# Patient Record
Sex: FEMALE | Race: BLACK OR AFRICAN AMERICAN | Employment: UNEMPLOYED | URBAN - METROPOLITAN AREA
[De-identification: names, ages, dates, MRNs, and addresses within clinical notes are randomized per-mention and may not be internally consistent; named-entity substitution may affect disease eponyms.]

---

## 2017-02-14 ENCOUNTER — OFFICE VISIT (OUTPATIENT)
Dept: LAB | Facility: REFERENCE LAB | Age: 47
End: 2017-02-14
Attending: OBSTETRICS & GYNECOLOGY
Payer: COMMERCIAL

## 2017-02-14 ENCOUNTER — OFFICE VISIT (OUTPATIENT)
Dept: OBGYN CLINIC | Facility: CLINIC | Age: 47
End: 2017-02-14

## 2017-02-14 VITALS — SYSTOLIC BLOOD PRESSURE: 122 MMHG | DIASTOLIC BLOOD PRESSURE: 68 MMHG

## 2017-02-14 DIAGNOSIS — N93.9 ABNORMAL UTERINE BLEEDING: ICD-10-CM

## 2017-02-14 DIAGNOSIS — D25.1 FIBROIDS, INTRAMURAL: ICD-10-CM

## 2017-02-14 DIAGNOSIS — D25.1 FIBROIDS, INTRAMURAL: Primary | ICD-10-CM

## 2017-02-14 PROBLEM — Z98.890 HISTORY OF ABDOMINOPLASTY: Status: ACTIVE | Noted: 2017-02-14

## 2017-02-14 LAB
BASOPHILS # BLD: 0 K/UL (ref 0–0.2)
BASOPHILS NFR BLD: 1 %
EOSINOPHIL # BLD: 0.1 K/UL (ref 0–0.7)
EOSINOPHIL NFR BLD: 2 %
ERYTHROCYTE [DISTWIDTH] IN BLOOD BY AUTOMATED COUNT: 16.4 % (ref 11–15)
HCT VFR BLD AUTO: 40.1 % (ref 35–48)
HGB BLD-MCNC: 12.9 G/DL (ref 12–16)
LYMPHOCYTES # BLD: 1.7 K/UL (ref 1–4)
LYMPHOCYTES NFR BLD: 36 %
MCH RBC QN AUTO: 27.2 PG (ref 27–32)
MCHC RBC AUTO-ENTMCNC: 32.1 G/DL (ref 32–37)
MCV RBC AUTO: 84.7 FL (ref 80–100)
MONOCYTES # BLD: 0.3 K/UL (ref 0–1)
MONOCYTES NFR BLD: 7 %
NEUTROPHILS # BLD AUTO: 2.6 K/UL (ref 1.8–7.7)
NEUTROPHILS NFR BLD: 55 %
PLATELET # BLD AUTO: 224 K/UL (ref 140–400)
PMV BLD AUTO: 11.3 FL (ref 7.4–10.3)
RBC # BLD AUTO: 4.74 M/UL (ref 3.7–5.4)
WBC # BLD AUTO: 4.7 K/UL (ref 4–11)

## 2017-02-14 PROCEDURE — 36415 COLL VENOUS BLD VENIPUNCTURE: CPT

## 2017-02-14 PROCEDURE — 99213 OFFICE O/P EST LOW 20 MIN: CPT | Performed by: OBSTETRICS & GYNECOLOGY

## 2017-02-14 PROCEDURE — 85025 COMPLETE CBC W/AUTO DIFF WBC: CPT

## 2017-02-14 RX ORDER — ERGOCALCIFEROL 1.25 MG/1
CAPSULE ORAL
Refills: 11 | COMMUNITY
Start: 2017-02-06 | End: 2020-02-17

## 2017-03-22 ENCOUNTER — TELEPHONE (OUTPATIENT)
Dept: OBGYN CLINIC | Facility: CLINIC | Age: 47
End: 2017-03-22

## 2017-03-22 DIAGNOSIS — N60.11 FIBROCYSTIC BREAST CHANGES OF BOTH BREASTS: Primary | ICD-10-CM

## 2017-03-22 DIAGNOSIS — N60.12 FIBROCYSTIC BREAST CHANGES OF BOTH BREASTS: Primary | ICD-10-CM

## 2017-03-22 NOTE — TELEPHONE ENCOUNTER
SPOKE WITH PATIENT REGARDING DIAG BILATERAL MAMMO W/ POSS USN DX FIBROCYSTIC BREASTS NEW ORDER PUT IN PATIENT ALSO STATES THAT SHE NEED OLD MAMMO FROM [place] TOLD HER I WILL FAX OVER HER IF SHE GET ME FAX NUMBER   Fax # [phone]

## 2017-03-28 ENCOUNTER — HOSPITAL ENCOUNTER (OUTPATIENT)
Dept: MAMMOGRAPHY | Facility: HOSPITAL | Age: 47
Discharge: HOME | End: 2017-03-28
Attending: OBSTETRICS & GYNECOLOGY
Payer: COMMERCIAL

## 2017-03-28 ENCOUNTER — HOSPITAL ENCOUNTER (OUTPATIENT)
Dept: ULTRASOUND IMAGING | Facility: HOSPITAL | Age: 47
Discharge: HOME | End: 2017-03-28
Attending: OBSTETRICS & GYNECOLOGY
Payer: COMMERCIAL

## 2017-03-28 DIAGNOSIS — N60.11 FIBROCYSTIC BREAST CHANGES OF BOTH BREASTS: ICD-10-CM

## 2017-03-28 DIAGNOSIS — N60.12 FIBROCYSTIC BREAST CHANGES OF BOTH BREASTS: ICD-10-CM

## 2017-03-28 PROCEDURE — 77066 DX MAMMO INCL CAD BI: CPT

## 2017-03-28 PROCEDURE — 76642 ULTRASOUND BREAST LIMITED: CPT

## 2017-04-03 ENCOUNTER — OFFICE VISIT (OUTPATIENT)
Dept: INTERNAL MEDICINE CLINIC | Facility: CLINIC | Age: 47
End: 2017-04-03

## 2017-04-03 VITALS
BODY MASS INDEX: 29.08 KG/M2 | HEART RATE: 72 BPM | HEIGHT: 62 IN | TEMPERATURE: 99 F | SYSTOLIC BLOOD PRESSURE: 100 MMHG | DIASTOLIC BLOOD PRESSURE: 60 MMHG | WEIGHT: 158 LBS

## 2017-04-03 DIAGNOSIS — L20.84 INTRINSIC ECZEMA: ICD-10-CM

## 2017-04-03 DIAGNOSIS — Z91.09 ENVIRONMENTAL ALLERGIES: ICD-10-CM

## 2017-04-03 DIAGNOSIS — S39.012A LOW BACK STRAIN, INITIAL ENCOUNTER: Primary | ICD-10-CM

## 2017-04-03 PROCEDURE — 99214 OFFICE O/P EST MOD 30 MIN: CPT | Performed by: INTERNAL MEDICINE

## 2017-04-03 PROCEDURE — 99212 OFFICE O/P EST SF 10 MIN: CPT | Performed by: INTERNAL MEDICINE

## 2017-04-03 RX ORDER — MELOXICAM 7.5 MG/1
7.5 TABLET ORAL DAILY
Qty: 30 TABLET | Refills: 5 | Status: SHIPPED | OUTPATIENT
Start: 2017-04-03 | End: 2018-05-16

## 2017-04-03 RX ORDER — TRIAMCINOLONE ACETONIDE 0.25 MG/G
CREAM TOPICAL
Qty: 15 G | Refills: 3 | Status: SHIPPED | OUTPATIENT
Start: 2017-04-03 | End: 2018-09-12

## 2017-04-03 NOTE — PROGRESS NOTES
HPI:    Patient ID: Mehdi Mosquera is a 40year old female. Back Pain  This is a chronic problem. The current episode started more than 1 month ago. The problem occurs constantly. The pain is present in the lumbar spine.  The pain radiates to the rig Genitourinary: Negative for bladder incontinence, dysuria, urgency, frequency, hematuria, flank pain, difficulty urinating, genital sores and pelvic pain. Musculoskeletal: Positive for myalgias and back pain. Negative for neck pain and neck stiffness. Lumbar back: She exhibits decreased range of motion, pain and spasm. Back:    Neurological: She is alert and oriented to person, place, and time. She exhibits normal muscle tone. Coordination normal.   Skin: Skin is warm. No rash noted.  No eryt

## 2017-08-29 ENCOUNTER — TELEPHONE (OUTPATIENT)
Dept: INTERNAL MEDICINE CLINIC | Facility: CLINIC | Age: 47
End: 2017-08-29

## 2017-08-29 DIAGNOSIS — L81.9 HYPERPIGMENTATION: Primary | ICD-10-CM

## 2017-08-30 ENCOUNTER — OFFICE VISIT (OUTPATIENT)
Dept: OBGYN CLINIC | Facility: CLINIC | Age: 47
End: 2017-08-30

## 2017-08-30 VITALS
HEIGHT: 63 IN | BODY MASS INDEX: 28.53 KG/M2 | WEIGHT: 161 LBS | SYSTOLIC BLOOD PRESSURE: 118 MMHG | DIASTOLIC BLOOD PRESSURE: 74 MMHG

## 2017-08-30 DIAGNOSIS — Z01.419 WOMEN'S ANNUAL ROUTINE GYNECOLOGICAL EXAMINATION: Primary | ICD-10-CM

## 2017-08-30 PROBLEM — Z98.890 STATUS POST EMBOLIZATION OF UTERINE ARTERY: Status: ACTIVE | Noted: 2017-02-14

## 2017-08-30 PROBLEM — E55.9 VITAMIN D DEFICIENCY: Status: ACTIVE | Noted: 2017-08-30

## 2017-08-30 PROCEDURE — 88175 CYTOPATH C/V AUTO FLUID REDO: CPT | Performed by: OBSTETRICS & GYNECOLOGY

## 2017-08-30 PROCEDURE — 99396 PREV VISIT EST AGE 40-64: CPT | Performed by: OBSTETRICS & GYNECOLOGY

## 2017-08-30 PROCEDURE — 87624 HPV HI-RISK TYP POOLED RSLT: CPT | Performed by: OBSTETRICS & GYNECOLOGY

## 2017-08-30 NOTE — PROGRESS NOTES
Елеан is here for gynecologic exam.  Patient has long-standing history of uterine fibroids. She has had uterine artery embolization previously. Her periods are 26-28 days apart patient says that at times to heavy but not each and every.   He is heavy at approx  No date: REDUCTION OF LARGE BREAST  No date: REDUCTION RIGHT    Allergies     No Known Allergies    Medications       Current Outpatient Prescriptions:  triamcinolone acetonide 0.025 % External Cream Apply bid Disp: 15 g Rfl: 3   Meloxicam 7.5 MG O tender, no masses  GYNE/: External Genitalia: Normal appearing, no lesions. Urethral meatus appear wnl, no abnormal discharge or lesions noted.            Bladder: well supported, urethra wnl, no lesions or fissures                     Vagina: normal pink

## 2017-09-01 LAB — HPV I/H RISK 1 DNA SPEC QL NAA+PROBE: NEGATIVE

## 2017-11-10 ENCOUNTER — HOSPITAL ENCOUNTER (EMERGENCY)
Facility: HOSPITAL | Age: 47
Discharge: HOME | End: 2017-11-11
Attending: EMERGENCY MEDICINE
Payer: COMMERCIAL

## 2017-11-10 VITALS
RESPIRATION RATE: 18 BRPM | HEIGHT: 62 IN | BODY MASS INDEX: 29.44 KG/M2 | SYSTOLIC BLOOD PRESSURE: 121 MMHG | HEART RATE: 77 BPM | WEIGHT: 160 LBS | OXYGEN SATURATION: 98 % | DIASTOLIC BLOOD PRESSURE: 57 MMHG | TEMPERATURE: 99 F

## 2017-11-10 DIAGNOSIS — R10.9 ABDOMINAL WALL PAIN: ICD-10-CM

## 2017-11-10 DIAGNOSIS — R07.89 ATYPICAL CHEST PAIN: Primary | ICD-10-CM

## 2017-11-10 PROCEDURE — 93005 ELECTROCARDIOGRAM TRACING: CPT

## 2017-11-10 PROCEDURE — 93010 ELECTROCARDIOGRAM REPORT: CPT | Performed by: EMERGENCY MEDICINE

## 2017-11-10 PROCEDURE — 99285 EMERGENCY DEPT VISIT HI MDM: CPT

## 2017-11-10 PROCEDURE — 36415 COLL VENOUS BLD VENIPUNCTURE: CPT

## 2017-11-11 ENCOUNTER — APPOINTMENT (OUTPATIENT)
Dept: GENERAL RADIOLOGY | Facility: HOSPITAL | Age: 47
End: 2017-11-11
Attending: EMERGENCY MEDICINE
Payer: COMMERCIAL

## 2017-11-11 PROCEDURE — 80053 COMPREHEN METABOLIC PANEL: CPT | Performed by: EMERGENCY MEDICINE

## 2017-11-11 PROCEDURE — 83690 ASSAY OF LIPASE: CPT | Performed by: EMERGENCY MEDICINE

## 2017-11-11 PROCEDURE — 84484 ASSAY OF TROPONIN QUANT: CPT | Performed by: EMERGENCY MEDICINE

## 2017-11-11 PROCEDURE — 81001 URINALYSIS AUTO W/SCOPE: CPT | Performed by: EMERGENCY MEDICINE

## 2017-11-11 PROCEDURE — 81025 URINE PREGNANCY TEST: CPT

## 2017-11-11 PROCEDURE — 71010 XR CHEST AP PORTABLE  (CPT=71010): CPT | Performed by: EMERGENCY MEDICINE

## 2017-11-11 PROCEDURE — 85025 COMPLETE CBC W/AUTO DIFF WBC: CPT | Performed by: EMERGENCY MEDICINE

## 2017-11-11 RX ORDER — DIAZEPAM 5 MG/1
5 TABLET ORAL EVERY 8 HOURS PRN
Qty: 15 TABLET | Refills: 0 | Status: SHIPPED | OUTPATIENT
Start: 2017-11-11 | End: 2017-11-16

## 2017-11-11 NOTE — ED PROVIDER NOTES
Patient Seen in: [facility] AND [facility] Emergency Department    History   Patient presents with:  Abdominal Pain    Stated Complaint: abd pain     HPI    39 yo F without PMH/PSH aside from fibroids presenting with multiple complaints - noting several months of acid 400 MCG Oral Tab,  Take 400 mcg by mouth daily. B Complex-C (SUPER B COMPLEX OR),  Take by mouth.        Family History   Problem Relation Age of Onset   • Diabetes Mother    • Cancer Father 70     lung   • Diabetes Maternal Grandmother        Tiana PLATELET.   Procedure                               Abnormality         Status                     ---------                               -----------         ------                     CBC W/ DIFFERENTIAL[[id number]]                              In process pain, electrolyte derangement, VTE, anemia, MSK pain, gastritis, pancreatitis, biliary colic.     Pulse ox: 98%:Normal on RA, as interpreted by myself    Evaluation for multiple complaints - atypical chest pain without red flags in low risk HEART, PERC nega

## 2017-11-11 NOTE — ED INITIAL ASSESSMENT (HPI)
abd pain x 2 months, also c/o intermittent CP x 1 month. CP increased tonight when she was out tonight.

## 2017-12-09 ENCOUNTER — OFFICE VISIT (OUTPATIENT)
Dept: DERMATOLOGY CLINIC | Facility: CLINIC | Age: 47
End: 2017-12-09

## 2017-12-09 DIAGNOSIS — L30.8 OTHER ECZEMA: Primary | ICD-10-CM

## 2017-12-09 DIAGNOSIS — L81.0 HYPERPIGMENTATION OF SKIN, POSTINFLAMMATORY: ICD-10-CM

## 2017-12-09 PROBLEM — L30.9 ECZEMA: Status: ACTIVE | Noted: 2017-12-09

## 2017-12-09 PROCEDURE — 99202 OFFICE O/P NEW SF 15 MIN: CPT | Performed by: DERMATOLOGY

## 2017-12-09 PROCEDURE — 99212 OFFICE O/P EST SF 10 MIN: CPT | Performed by: DERMATOLOGY

## 2017-12-09 RX ORDER — TACROLIMUS 1 MG/G
1 OINTMENT TOPICAL 2 TIMES DAILY
Qty: 30 G | Refills: 3 | Status: SHIPPED | OUTPATIENT
Start: 2017-12-09 | End: 2018-09-12

## 2017-12-09 NOTE — PROGRESS NOTES
HPI:     Chief Complaint     Derm Problem        HPI     Derm Problem    Additional comments: New pt. Pt presents with referral for hyperpigmentation. Pt states that currently she is not treating with any OTC or Rx medications.  Pt denies pain or pruritus Oral Tab Take 400 mcg by mouth daily.  Disp:  Rfl:      Allergies:   No Known Allergies    Past Medical History:   Diagnosis Date   • Anemia    • Bacterial vaginal infection    • Decorative tattoo    • Fibroids    • Gestational diabetes    • Human papilloma low-grade inflammation possibly from eyelid dermatitis or eczema or allergies. I have explained to patient that we cannot treat with topical steroids or bleaching creams due to location.   Would therefore like to treat the probable still low-grade dermatit

## 2017-12-09 NOTE — PROGRESS NOTES
Past Medical History:   Diagnosis Date   • Anemia    • Bacterial vaginal infection    • Decorative tattoo    • Fibroids    • Gestational diabetes    • Human papilloma virus infection    • Pap smear for cervical cancer screening 11-    negative pap,

## 2017-12-11 ENCOUNTER — TELEPHONE (OUTPATIENT)
Dept: DERMATOLOGY CLINIC | Facility: CLINIC | Age: 47
End: 2017-12-11

## 2017-12-14 NOTE — TELEPHONE ENCOUNTER
[facility] pharmacy contacted informed PA was approved medication went through insurance per pharmacist pharmacy will contact patient.

## 2017-12-18 ENCOUNTER — OFFICE VISIT (OUTPATIENT)
Dept: INTERNAL MEDICINE CLINIC | Facility: CLINIC | Age: 47
End: 2017-12-18

## 2017-12-18 VITALS
HEIGHT: 63 IN | DIASTOLIC BLOOD PRESSURE: 60 MMHG | SYSTOLIC BLOOD PRESSURE: 96 MMHG | HEART RATE: 80 BPM | BODY MASS INDEX: 29.06 KG/M2 | TEMPERATURE: 99 F | WEIGHT: 164 LBS

## 2017-12-18 DIAGNOSIS — R10.9 ABDOMINAL PAIN IN FEMALE PATIENT: ICD-10-CM

## 2017-12-18 DIAGNOSIS — G56.03 CARPAL TUNNEL SYNDROME ON BOTH SIDES: Primary | ICD-10-CM

## 2017-12-18 PROCEDURE — 99214 OFFICE O/P EST MOD 30 MIN: CPT | Performed by: INTERNAL MEDICINE

## 2017-12-18 PROCEDURE — 99212 OFFICE O/P EST SF 10 MIN: CPT | Performed by: INTERNAL MEDICINE

## 2017-12-18 RX ORDER — MULTIVIT-MIN/IRON FUM/FOLIC AC 7.5 MG-4
1 TABLET ORAL DAILY
COMMUNITY
End: 2020-02-17

## 2017-12-18 NOTE — PROGRESS NOTES
HPI:    Patient ID: Charla Quinn is a 56year old female. Pain   This is a chronic problem. The current episode started more than 1 month ago. The problem occurs constantly. The problem has been unchanged.  Associated symptoms include abdominal mikey Allergic/Immunologic: Negative for immunocompromised state. Neurological: Negative for dizziness, syncope, facial asymmetry, weakness, light-headedness, numbness and headaches.             Current Outpatient Prescriptions:  Multiple Vitamins-Minerals (MUL Bilateral carpal tunnel syndrome  Physical therapy  eval treat for carpal tunnel syndrome. Abdominal pain in female patient  Abdominal mass to the left mid abdomen  CT Abdomen. No orders of the defined types were placed in this encounter.       Me

## 2018-01-20 ENCOUNTER — HOSPITAL ENCOUNTER (OUTPATIENT)
Dept: CT IMAGING | Facility: HOSPITAL | Age: 48
Discharge: HOME | End: 2018-01-20
Attending: INTERNAL MEDICINE
Payer: COMMERCIAL

## 2018-01-20 DIAGNOSIS — R10.9 ABDOMINAL PAIN IN FEMALE PATIENT: ICD-10-CM

## 2018-01-20 LAB — CREAT BLD-MCNC: 0.8 MG/DL (ref 0.5–1.5)

## 2018-01-20 PROCEDURE — 74160 CT ABDOMEN W/CONTRAST: CPT | Performed by: INTERNAL MEDICINE

## 2018-01-20 PROCEDURE — 82565 ASSAY OF CREATININE: CPT

## 2018-01-22 ENCOUNTER — TELEPHONE (OUTPATIENT)
Dept: INTERNAL MEDICINE CLINIC | Facility: CLINIC | Age: 48
End: 2018-01-22

## 2018-01-22 DIAGNOSIS — K42.9 UMBILICAL HERNIA WITHOUT OBSTRUCTION AND WITHOUT GANGRENE: Primary | ICD-10-CM

## 2018-01-25 ENCOUNTER — TELEPHONE (OUTPATIENT)
Dept: INTERNAL MEDICINE CLINIC | Facility: CLINIC | Age: 48
End: 2018-01-25

## 2018-01-25 NOTE — TELEPHONE ENCOUNTER
Left message for patient to call the office and make appointment, bring results with her, informed she will be having fasting labs and urine tests

## 2018-01-25 NOTE — TELEPHONE ENCOUNTER
Patient had a DOT test done, urine showed positive for protein. Patient would like to speak with you.

## 2018-01-25 NOTE — TELEPHONE ENCOUNTER
Patient will need to see me, bring results, she will need another urine test here and fasting blood tests. cmp hgbaic. UA and Urine for microalb.

## 2018-02-05 ENCOUNTER — OFFICE VISIT (OUTPATIENT)
Dept: INTERNAL MEDICINE CLINIC | Facility: CLINIC | Age: 48
End: 2018-02-05

## 2018-02-05 VITALS
TEMPERATURE: 98 F | DIASTOLIC BLOOD PRESSURE: 70 MMHG | HEIGHT: 63 IN | BODY MASS INDEX: 30.12 KG/M2 | SYSTOLIC BLOOD PRESSURE: 90 MMHG | HEART RATE: 72 BPM | WEIGHT: 170 LBS

## 2018-02-05 DIAGNOSIS — K59.01 SLOW TRANSIT CONSTIPATION: Primary | ICD-10-CM

## 2018-02-05 DIAGNOSIS — R80.1 PERSISTENT PROTEINURIA: ICD-10-CM

## 2018-02-05 LAB
ALBUMIN SERPL BCP-MCNC: 4 G/DL (ref 3.5–4.8)
ALBUMIN/GLOB SERPL: 1.2 {RATIO} (ref 1–2)
ALP SERPL-CCNC: 43 U/L (ref 32–100)
ALT SERPL-CCNC: 20 U/L (ref 14–54)
ANION GAP SERPL CALC-SCNC: 8 MMOL/L (ref 0–18)
AST SERPL-CCNC: 23 U/L (ref 15–41)
BILIRUB SERPL-MCNC: 0.3 MG/DL (ref 0.3–1.2)
BUN SERPL-MCNC: 7 MG/DL (ref 8–20)
BUN/CREAT SERPL: 10.8 (ref 10–20)
CALCIUM SERPL-MCNC: 9.4 MG/DL (ref 8.5–10.5)
CHLORIDE SERPL-SCNC: 103 MMOL/L (ref 95–110)
CO2 SERPL-SCNC: 23 MMOL/L (ref 22–32)
CREAT SERPL-MCNC: 0.65 MG/DL (ref 0.5–1.5)
CREAT UR-MCNC: 133.9 MG/DL
GLOBULIN PLAS-MCNC: 3.3 G/DL (ref 2.5–3.7)
GLUCOSE SERPL-MCNC: 92 MG/DL (ref 70–99)
MICROALBUMIN UR-MCNC: 1.1 MG/DL (ref 0–1.8)
MICROALBUMIN/CREAT UR: 8.2 MG/G{CREAT} (ref 0–20)
OSMOLALITY UR CALC.SUM OF ELEC: 276 MOSM/KG (ref 275–295)
POTASSIUM SERPL-SCNC: 4 MMOL/L (ref 3.3–5.1)
PROT SERPL-MCNC: 7.3 G/DL (ref 5.9–8.4)
SODIUM SERPL-SCNC: 134 MMOL/L (ref 136–144)

## 2018-02-05 PROCEDURE — 99214 OFFICE O/P EST MOD 30 MIN: CPT | Performed by: INTERNAL MEDICINE

## 2018-02-05 PROCEDURE — 99212 OFFICE O/P EST SF 10 MIN: CPT | Performed by: INTERNAL MEDICINE

## 2018-02-05 NOTE — PROGRESS NOTES
HPI:    Patient ID: Anthony Dupont is a 56year old female. Constipation   This is a chronic problem. The current episode started more than 1 year ago. The problem is unchanged. Her stool frequency is 1 time per week or less.  The stool is described stiffness. Allergic/Immunologic: Negative for immunocompromised state. Neurological: Negative for dizziness, syncope, facial asymmetry, weakness, light-headedness, numbness and headaches.             Current Outpatient Prescriptions:  Multiple Vitamins- recheck . Urine, might  Have had her period at the time of the work related urine studies. Slow transit constipation  Colace 100 mg twice a day ,   Citrucel one tab bid  And miralax daily .    If no better see GI          Orders Placed This Encounter

## 2018-02-06 LAB — HBA1C MFR BLD: 5.8 % (ref 4–6)

## 2018-04-23 ENCOUNTER — NURSE ONLY (OUTPATIENT)
Dept: FAMILY MEDICINE CLINIC | Facility: CLINIC | Age: 48
End: 2018-04-23

## 2018-04-23 PROCEDURE — 86580 TB INTRADERMAL TEST: CPT | Performed by: FAMILY MEDICINE

## 2018-04-25 ENCOUNTER — NURSE ONLY (OUTPATIENT)
Dept: FAMILY MEDICINE CLINIC | Facility: CLINIC | Age: 48
End: 2018-04-25

## 2018-04-25 DIAGNOSIS — Z11.1 ENCOUNTER FOR PPD SKIN TEST READING: Primary | ICD-10-CM

## 2018-04-25 NOTE — H&P
NAME AND  VERIFIED. PATIENT HERE FOR PPD READING. RESULT NEGATIVE. RESULT ENTERED AND LETTER GENERATED WITH RESULT.

## 2018-05-25 ENCOUNTER — TELEPHONE (OUTPATIENT)
Dept: FAMILY MEDICINE CLINIC | Facility: CLINIC | Age: 48
End: 2018-05-25

## 2018-05-25 DIAGNOSIS — L72.9 SCALP CYST: Primary | ICD-10-CM

## 2018-05-25 NOTE — TELEPHONE ENCOUNTER
Has a cyst on her head, had seen Dr Thee Zuniga, but not satisfied, would like a referral to a different dermatologist.    Will call her with information

## 2018-07-03 ENCOUNTER — LAB REQUISITION (OUTPATIENT)
Dept: LAB | Facility: HOSPITAL | Age: 48
End: 2018-07-03
Payer: COMMERCIAL

## 2018-07-03 DIAGNOSIS — R20.9 DISTURBANCE OF SKIN SENSATION: ICD-10-CM

## 2018-07-03 DIAGNOSIS — L72.0 EPIDERMAL CYST: ICD-10-CM

## 2018-07-03 PROCEDURE — 88304 TISSUE EXAM BY PATHOLOGIST: CPT | Performed by: DERMATOLOGY

## 2018-07-03 PROCEDURE — 88305 TISSUE EXAM BY PATHOLOGIST: CPT | Performed by: DERMATOLOGY

## 2018-09-12 ENCOUNTER — OFFICE VISIT (OUTPATIENT)
Dept: OBGYN CLINIC | Facility: CLINIC | Age: 48
End: 2018-09-12

## 2018-09-12 VITALS
HEIGHT: 63 IN | BODY MASS INDEX: 29.23 KG/M2 | SYSTOLIC BLOOD PRESSURE: 116 MMHG | WEIGHT: 165 LBS | DIASTOLIC BLOOD PRESSURE: 72 MMHG

## 2018-09-12 DIAGNOSIS — N93.9 ABNORMAL UTERINE BLEEDING: ICD-10-CM

## 2018-09-12 DIAGNOSIS — Z01.419 WOMEN'S ANNUAL ROUTINE GYNECOLOGICAL EXAMINATION: Primary | ICD-10-CM

## 2018-09-12 DIAGNOSIS — D25.1 FIBROIDS, INTRAMURAL: ICD-10-CM

## 2018-09-12 PROBLEM — Z98.890 H/O REDUCTION MAMMOPLASTY: Status: ACTIVE | Noted: 2018-09-12

## 2018-09-12 PROCEDURE — 88175 CYTOPATH C/V AUTO FLUID REDO: CPT | Performed by: OBSTETRICS & GYNECOLOGY

## 2018-09-12 PROCEDURE — 87491 CHLMYD TRACH DNA AMP PROBE: CPT | Performed by: OBSTETRICS & GYNECOLOGY

## 2018-09-12 PROCEDURE — 99396 PREV VISIT EST AGE 40-64: CPT | Performed by: OBSTETRICS & GYNECOLOGY

## 2018-09-12 PROCEDURE — 87591 N.GONORRHOEAE DNA AMP PROB: CPT | Performed by: OBSTETRICS & GYNECOLOGY

## 2018-09-12 PROCEDURE — 87624 HPV HI-RISK TYP POOLED RSLT: CPT | Performed by: OBSTETRICS & GYNECOLOGY

## 2018-09-13 LAB
C TRACH DNA SPEC QL NAA+PROBE: NEGATIVE
HPV I/H RISK 1 DNA SPEC QL NAA+PROBE: NEGATIVE
N GONORRHOEA DNA SPEC QL NAA+PROBE: NEGATIVE

## 2018-09-13 NOTE — PROGRESS NOTES
Елена for a checkup. Patient has history of uterine fibroids and has had abnormal uterine bleeding. Patient says that her periods had about 28-30 days apart and sometimes they last for about 6-7 days.     On examination:  [address] (MULTI-VITAMIN/MINERALS) Oral Tab Take 1 tablet by mouth daily. Disp:  Rfl:    ergocalciferol 22299 units Oral Cap TK 1 C PO 1 TIME A WEEK Disp:  Rfl: 11   Vitamin C 500 MG Oral Tab Take 500 mg by mouth 2 (two) times daily.  Disp:  Rfl:        Family Histor axillary adenopathy  ABDOMEN: Soft, non distended; non tender, no masses  GYNE/: External Genitalia: Normal appearing, no lesions. Urethral meatus appear wnl, no abnormal discharge or lesions noted.            Bladder: well supported, urethra wnl, no lesi

## 2018-09-17 ENCOUNTER — TELEPHONE (OUTPATIENT)
Dept: OBGYN CLINIC | Facility: CLINIC | Age: 48
End: 2018-09-17

## 2018-09-17 RX ORDER — CLINDAMYCIN HYDROCHLORIDE 300 MG/1
300 CAPSULE ORAL 2 TIMES DAILY
Qty: 14 CAPSULE | Refills: 1 | Status: SHIPPED | OUTPATIENT
Start: 2018-09-17 | End: 2018-09-24

## 2018-10-17 ENCOUNTER — HOSPITAL ENCOUNTER (OUTPATIENT)
Age: 48
Discharge: HOME | End: 2018-10-17
Attending: FAMILY MEDICINE
Payer: COMMERCIAL

## 2018-10-17 VITALS
OXYGEN SATURATION: 100 % | RESPIRATION RATE: 20 BRPM | DIASTOLIC BLOOD PRESSURE: 68 MMHG | HEART RATE: 72 BPM | SYSTOLIC BLOOD PRESSURE: 118 MMHG | TEMPERATURE: 98 F

## 2018-10-17 DIAGNOSIS — H10.31 ACUTE CONJUNCTIVITIS OF RIGHT EYE, UNSPECIFIED ACUTE CONJUNCTIVITIS TYPE: Primary | ICD-10-CM

## 2018-10-17 PROCEDURE — 99213 OFFICE O/P EST LOW 20 MIN: CPT

## 2018-10-17 PROCEDURE — 99214 OFFICE O/P EST MOD 30 MIN: CPT

## 2018-10-17 RX ORDER — TETRACAINE HYDROCHLORIDE 5 MG/ML
1 SOLUTION OPHTHALMIC ONCE
Status: COMPLETED | OUTPATIENT
Start: 2018-10-17 | End: 2018-10-17

## 2018-10-17 RX ORDER — TOBRAMYCIN AND DEXAMETHASONE 3; 1 MG/ML; MG/ML
2 SUSPENSION/ DROPS OPHTHALMIC
Qty: 1 BOTTLE | Refills: 0 | Status: SHIPPED | OUTPATIENT
Start: 2018-10-17 | End: 2018-10-22

## 2018-10-17 RX ORDER — PURIFIED WATER 986 MG/ML
1 SOLUTION OPHTHALMIC ONCE
Status: COMPLETED | OUTPATIENT
Start: 2018-10-17 | End: 2018-10-17

## 2018-10-17 NOTE — ED INITIAL ASSESSMENT (HPI)
Pt here with complaints of reddened left eye that started on Sunday, pt states her eye is very irritating and tearing a lot , pt denies any injury to her eye but states hair could have gotten in her eye

## 2018-10-17 NOTE — ED PROVIDER NOTES
Patient Seen in: [facility]    History   Patient presents with:   Eye Visual Problem (opthalmic)    Stated Complaint: EYE PROBLEM    HPI    52-year-old female presents with 3 days of right eye redness and watery discharge Constitutional: She is oriented to person, place, and time. She appears well-developed and well-nourished. HENT:   Head: Normocephalic and atraumatic. Eyes: EOM and lids are normal. Pupils are equal, round, and reactive to light.  Lids are everted and while awake for 5 days.   Qty: 1 Bottle Refills: 0

## 2018-10-18 NOTE — ED NOTES
Pt discharged home, prescription called into the [facility] pharmacy , pt instructed to follow up with the opthalmologist if symptoms do not improve

## 2018-12-17 ENCOUNTER — OFFICE VISIT (OUTPATIENT)
Dept: INTERNAL MEDICINE CLINIC | Facility: CLINIC | Age: 48
End: 2018-12-17

## 2018-12-17 VITALS
DIASTOLIC BLOOD PRESSURE: 80 MMHG | HEART RATE: 64 BPM | SYSTOLIC BLOOD PRESSURE: 116 MMHG | WEIGHT: 164 LBS | TEMPERATURE: 98 F | HEIGHT: 63 IN | BODY MASS INDEX: 29.06 KG/M2

## 2018-12-17 DIAGNOSIS — G56.03 BILATERAL CARPAL TUNNEL SYNDROME: ICD-10-CM

## 2018-12-17 DIAGNOSIS — F41.9 ANXIETY: ICD-10-CM

## 2018-12-17 DIAGNOSIS — M54.40 ACUTE BILATERAL LOW BACK PAIN WITH SCIATICA, SCIATICA LATERALITY UNSPECIFIED: Primary | ICD-10-CM

## 2018-12-17 PROCEDURE — 99214 OFFICE O/P EST MOD 30 MIN: CPT | Performed by: INTERNAL MEDICINE

## 2018-12-17 PROCEDURE — 99212 OFFICE O/P EST SF 10 MIN: CPT | Performed by: INTERNAL MEDICINE

## 2018-12-17 RX ORDER — ESCITALOPRAM OXALATE 10 MG/1
10 TABLET ORAL DAILY
Qty: 30 TABLET | Refills: 11 | Status: SHIPPED | OUTPATIENT
Start: 2018-12-17 | End: 2020-02-17

## 2018-12-17 NOTE — PROGRESS NOTES
HPI:    Patient ID: Susana Fleischer is a 53year old female. Back Pain   This is a recurrent problem. The current episode started more than 1 month ago. The problem occurs intermittently. The problem has been gradually improving since onset.  The pain i Genitourinary: Negative for bladder incontinence, difficulty urinating, dysuria, flank pain, frequency, genital sores, hematuria, pelvic pain and urgency. Musculoskeletal: Positive for back pain. Negative for neck pain and neck stiffness.    Allergic/Im normal. Judgment and thought content normal.   Vitals reviewed. ASSESSMENT/PLAN:   Carpal tunnel syndrome on both sides  EMG , possible physical therapy , and then surgery if needed.      Anxiety  Starting menopause, patient also lost her dad in

## 2018-12-17 NOTE — ASSESSMENT & PLAN NOTE
Starting menopause, patient also lost her dad in September and she has been depressed and anxious. Will start lexapro. Labs due in Feb.  [facility]

## 2019-02-19 ENCOUNTER — ORDER TRANSCRIPTION (OUTPATIENT)
Dept: ADMINISTRATIVE | Facility: HOSPITAL | Age: 49
End: 2019-02-19

## 2019-02-19 ENCOUNTER — TELEPHONE (OUTPATIENT)
Dept: FAMILY MEDICINE CLINIC | Facility: CLINIC | Age: 49
End: 2019-02-19

## 2019-02-19 DIAGNOSIS — G56.03 CARPAL TUNNEL SYNDROME, BILATERAL: Primary | ICD-10-CM

## 2019-03-15 ENCOUNTER — TELEPHONE (OUTPATIENT)
Dept: FAMILY MEDICINE CLINIC | Facility: CLINIC | Age: 49
End: 2019-03-15

## 2019-03-15 DIAGNOSIS — G56.03 BILATERAL CARPAL TUNNEL SYNDROME: Primary | ICD-10-CM

## 2019-03-15 NOTE — TELEPHONE ENCOUNTER
Is requesting if she could have a copy of her TB test she had last year, can mail to patient. Deisy Raza

## 2019-04-03 ENCOUNTER — PROCEDURE VISIT (OUTPATIENT)
Dept: NEUROLOGY | Facility: CLINIC | Age: 49
End: 2019-04-03

## 2019-04-03 DIAGNOSIS — R20.0 NUMBNESS AND TINGLING IN BOTH HANDS: ICD-10-CM

## 2019-04-03 DIAGNOSIS — R20.2 NUMBNESS AND TINGLING IN BOTH HANDS: ICD-10-CM

## 2019-04-03 PROCEDURE — 95910 NRV CNDJ TEST 7-8 STUDIES: CPT | Performed by: PHYSICAL MEDICINE & REHABILITATION

## 2019-04-03 PROCEDURE — 95886 MUSC TEST DONE W/N TEST COMP: CPT | Performed by: PHYSICAL MEDICINE & REHABILITATION

## 2019-04-03 NOTE — PROCEDURES
[address] [place] [address]  Phone: [phone]  Fax: [phone] REPORT          Patient: Sandy Brooks Hand Dominance: right handed  Patient ID: [id number] Referring Dr:  ? In the L Median - Digit II study  o the response was considered absent for Wrist stimulation    The needle EMG study was normal in all 5 tested muscles: L. Deltoid, L. Biceps brachii, L. Triceps brachii, L. Pronator teres, L. Abductor pollicis brevis. Nerve / Sites Rec.  Site Onset Lat Peak Lat NP Amp PP Amp Segments Distance Velocity     ms ms µV µV  cm m/s   R Median - Digit II      Wrist Dig II NR NR NR NR Wrist - Dig II 14 NR   L Median - Digit II      Wrist Dig II NR NR NR NR Wrist - Dig II 14 NR

## 2019-04-15 ENCOUNTER — TELEPHONE (OUTPATIENT)
Dept: FAMILY MEDICINE CLINIC | Facility: CLINIC | Age: 49
End: 2019-04-15

## 2019-04-15 NOTE — TELEPHONE ENCOUNTER
Results printed and mailed to patient home address in [facility]

## 2019-04-15 NOTE — TELEPHONE ENCOUNTER
Requesting copy of her TB test, would like it mailed to her, also wants to know how long it is good for.

## 2019-05-13 ENCOUNTER — TELEPHONE (OUTPATIENT)
Dept: FAMILY MEDICINE CLINIC | Facility: CLINIC | Age: 49
End: 2019-05-13

## 2019-05-13 DIAGNOSIS — G56.03 BILATERAL CARPAL TUNNEL SYNDROME: Primary | ICD-10-CM

## 2019-05-14 NOTE — TELEPHONE ENCOUNTER
Referral for Dr. Lu Esquivel entered and routed to Dr. Fran Sanders. Patient notified of emg results and Dr. Lu Esquivel information given to the patient.

## 2019-05-14 NOTE — TELEPHONE ENCOUNTER
Call patient she has bilateral carpal tunnel syndrome, she should see Plastics. Dr hSannan Ulrich, please enter referral if not already given.

## 2019-07-18 DIAGNOSIS — Z92.89 HX OF SCREENING MAMMOGRAPHY: Primary | ICD-10-CM

## 2019-07-22 ENCOUNTER — HOSPITAL ENCOUNTER (OUTPATIENT)
Dept: MAMMOGRAPHY | Age: 49
Discharge: HOME | End: 2019-07-22
Attending: OBSTETRICS & GYNECOLOGY
Payer: COMMERCIAL

## 2019-07-22 DIAGNOSIS — Z92.89 HX OF SCREENING MAMMOGRAPHY: ICD-10-CM

## 2019-07-22 PROCEDURE — 77067 SCR MAMMO BI INCL CAD: CPT | Performed by: OBSTETRICS & GYNECOLOGY

## 2019-07-22 PROCEDURE — 77063 BREAST TOMOSYNTHESIS BI: CPT | Performed by: OBSTETRICS & GYNECOLOGY

## 2019-07-27 ENCOUNTER — HOSPITAL ENCOUNTER (OUTPATIENT)
Age: 49
Discharge: HOME | End: 2019-07-27
Payer: COMMERCIAL

## 2019-07-27 ENCOUNTER — APPOINTMENT (OUTPATIENT)
Dept: GENERAL RADIOLOGY | Age: 49
End: 2019-07-27
Attending: NURSE PRACTITIONER
Payer: COMMERCIAL

## 2019-07-27 VITALS
OXYGEN SATURATION: 99 % | HEART RATE: 60 BPM | SYSTOLIC BLOOD PRESSURE: 123 MMHG | DIASTOLIC BLOOD PRESSURE: 84 MMHG | TEMPERATURE: 98 F | RESPIRATION RATE: 18 BRPM

## 2019-07-27 DIAGNOSIS — J02.9 SORE THROAT: Primary | ICD-10-CM

## 2019-07-27 DIAGNOSIS — R13.10 ODYNOPHAGIA: ICD-10-CM

## 2019-07-27 LAB — POCT RAPID STREP: NEGATIVE

## 2019-07-27 PROCEDURE — 99214 OFFICE O/P EST MOD 30 MIN: CPT

## 2019-07-27 PROCEDURE — 87081 CULTURE SCREEN ONLY: CPT | Performed by: NURSE PRACTITIONER

## 2019-07-27 PROCEDURE — 87430 STREP A AG IA: CPT | Performed by: NURSE PRACTITIONER

## 2019-07-27 PROCEDURE — 99204 OFFICE O/P NEW MOD 45 MIN: CPT

## 2019-07-27 PROCEDURE — 70360 X-RAY EXAM OF NECK: CPT | Performed by: NURSE PRACTITIONER

## 2019-07-27 NOTE — ED PROVIDER NOTES
Patient Seen in: [facility] [place]    History   Patient presents with:  Sore Throat    Stated Complaint: Sore throat/3wk    50-year-old female who presents to the immediate care with complaints of scratchy sore throat x1 week.   Loco use: Yes      Alcohol/week: 0.0 standard drinks      Comment: socially    Drug use: No      Review of Systems   HENT: Positive for congestion, postnasal drip and sore throat. Musculoskeletal: Negative. Hematological: Negative.     Psychiatric/Behavior None.  INDICATIONS:  Sore throat/1 wk  PATIENT STATED HISTORY: (As transcribed by Technologist)  Pt has had a scratchy throat for 1 week, that she even feels like it may close when she's lying down and  feels like a lump on left side of the neck.     Marvin Christine 7/22/2019 at 15:43     Approved by: Laci Massey MD                   [facility]   I have discussed with the patient and/or family the results of test, differential diagnosis, treatment plan, warning signs and symptoms which should prompt immediate return.

## 2019-07-30 ENCOUNTER — OFFICE VISIT (OUTPATIENT)
Dept: INTERNAL MEDICINE CLINIC | Facility: CLINIC | Age: 49
End: 2019-07-30

## 2019-07-30 VITALS
HEIGHT: 63 IN | HEART RATE: 72 BPM | DIASTOLIC BLOOD PRESSURE: 60 MMHG | TEMPERATURE: 98 F | WEIGHT: 162 LBS | BODY MASS INDEX: 28.7 KG/M2 | SYSTOLIC BLOOD PRESSURE: 100 MMHG

## 2019-07-30 DIAGNOSIS — Z00.00 ANNUAL PHYSICAL EXAM: Primary | ICD-10-CM

## 2019-07-30 DIAGNOSIS — G56.03 BILATERAL CARPAL TUNNEL SYNDROME: ICD-10-CM

## 2019-07-30 PROCEDURE — 99396 PREV VISIT EST AGE 40-64: CPT | Performed by: INTERNAL MEDICINE

## 2019-07-30 NOTE — H&P
HPI:    Patient ID: Bhumika Martinez is a 53year old female. HPIpatient is doing well, less numbness in her hands for now since she is driving less. Father just passed of lung cancer, mom had knee replacement . She is apply ing for doing foster care. flank pain, frequency, genital sores, hematuria and urgency. Musculoskeletal: Negative for neck pain and neck stiffness. Allergic/Immunologic: Negative for immunocompromised state.    Neurological: Negative for dizziness, syncope, facial asymmetry, weak reviewed. ASSESSMENT/PLAN:   Annual physical exam  Labs,   Just had mammo  Physical today   Form for foster care when she sends it. Bilateral carpal tunnel syndrome  Has had EMG, see plastics when ready .        Orders Placed This Encounter

## 2019-07-30 NOTE — ED NOTES
Spoke with patient who states she is feeling better-informed her final strep was negative. She verbalized understanding.

## 2020-01-16 ENCOUNTER — ORDER TRANSCRIPTION (OUTPATIENT)
Dept: ADMINISTRATIVE | Facility: HOSPITAL | Age: 50
End: 2020-01-16

## 2020-01-16 DIAGNOSIS — G56.03 CARPAL TUNNEL SYNDROME, BILATERAL UPPER LIMBS: Primary | ICD-10-CM

## 2020-02-03 ENCOUNTER — OFFICE VISIT (OUTPATIENT)
Dept: SURGERY | Facility: CLINIC | Age: 50
End: 2020-02-03
Payer: MEDICAID

## 2020-02-03 DIAGNOSIS — G56.03 BILATERAL CARPAL TUNNEL SYNDROME: Primary | ICD-10-CM

## 2020-02-03 PROCEDURE — 99243 OFF/OP CNSLTJ NEW/EST LOW 30: CPT | Performed by: PLASTIC SURGERY

## 2020-02-03 RX ORDER — HYDROCODONE BITARTRATE AND ACETAMINOPHEN 7.5; 325 MG/1; MG/1
1 TABLET ORAL
Qty: 10 TABLET | Refills: 0 | Status: SHIPPED | OUTPATIENT
Start: 2020-02-03 | End: 2020-03-06

## 2020-02-03 NOTE — H&P
Gail Alexander is a 51year old female that presents with Patient presents with: Follow - Up: bilateral carpal tunnel   .     REFERRED BY:   Caro Suárez MD      Pacemaker: No  Latex Allergy: no  Coumadin: No  Plavix: No  Other anticoagulants: No  Car years\"     Pain:    Constant, \"Pins and needles, and shooting pain radiates to my arm\", rates pain 10/10, take ibuprophen, somewhat effective      Night symptoms:  Yes, worst at night \"I can't sleep\"     Functional problems: Yes, gripping difficulty • PRIOR MYOMECTOMY     • REDUCTION LEFT      1990 approx   • REDUCTION OF LARGE BREAST     • REDUCTION RIGHT       Social History    Socioeconomic History      Marital status:       Spouse name: Not on file      Number of children: Not on file Spending [place] of Time in [place]: Yes        Bad sunburns in the past: No        Tanning Salons in the Past: No        Hx of Radiation Treatments: No        Regular use of sun block: Not Asked    Social History Narrative      Not on file    Family Histor indicated. This requires surgery. We discussed the procedure at length, including risks as indicated on the Surgical Request Form. Questions were answered and the patient wishes to proceed with treatment.     Some symptoms may persist   Some symptoms may

## 2020-02-03 NOTE — H&P (VIEW-ONLY)
Ziggy Keyes is a 51year old female that presents with Patient presents with: Follow - Up: bilateral carpal tunnel   .     REFERRED BY:   Tucker Verde MD      Pacemaker: No  Latex Allergy: no  Coumadin: No  Plavix: No  Other anticoagulants: No  Car years\"     Pain:    Constant, \"Pins and needles, and shooting pain radiates to my arm\", rates pain 10/10, take ibuprophen, somewhat effective      Night symptoms:  Yes, worst at night \"I can't sleep\"     Functional problems: Yes, gripping difficulty • PRIOR MYOMECTOMY     • REDUCTION LEFT      1990 approx   • REDUCTION OF LARGE BREAST     • REDUCTION RIGHT       Social History    Socioeconomic History      Marital status:       Spouse name: Not on file      Number of children: Not on file Spending [place] of Time in [place]: Yes        Bad sunburns in the past: No        Tanning Salons in the Past: No        Hx of Radiation Treatments: No        Regular use of sun block: Not Asked    Social History Narrative      Not on file    Family Histor indicated. This requires surgery. We discussed the procedure at length, including risks as indicated on the Surgical Request Form. Questions were answered and the patient wishes to proceed with treatment.     Some symptoms may persist   Some symptoms may

## 2020-02-10 ENCOUNTER — OFFICE VISIT (OUTPATIENT)
Dept: SURGERY | Facility: CLINIC | Age: 50
End: 2020-02-10
Payer: MEDICAID

## 2020-02-10 DIAGNOSIS — M62.81 DISTAL MUSCLE WEAKNESS: ICD-10-CM

## 2020-02-10 DIAGNOSIS — M25.642 STIFFNESS OF JOINT, HAND, LEFT: Primary | ICD-10-CM

## 2020-02-10 NOTE — PROGRESS NOTES
Pre-op Carpal Tunnel :    Subjective:I am having my left upper extremity fixed first.      Objective:    1) Occupational Therapy provided written hand out and demonstrated home exercise program to be initiated the day of surgery.     A)   Tendon gliding exe

## 2020-02-17 ENCOUNTER — OFFICE VISIT (OUTPATIENT)
Dept: OBGYN CLINIC | Facility: CLINIC | Age: 50
End: 2020-02-17
Payer: MEDICAID

## 2020-02-17 ENCOUNTER — TELEPHONE (OUTPATIENT)
Dept: SURGERY | Facility: CLINIC | Age: 50
End: 2020-02-17

## 2020-02-17 VITALS
HEIGHT: 63 IN | DIASTOLIC BLOOD PRESSURE: 74 MMHG | WEIGHT: 171 LBS | BODY MASS INDEX: 30.3 KG/M2 | SYSTOLIC BLOOD PRESSURE: 122 MMHG

## 2020-02-17 DIAGNOSIS — D25.1 FIBROIDS, INTRAMURAL: ICD-10-CM

## 2020-02-17 DIAGNOSIS — Z01.419 WOMEN'S ANNUAL ROUTINE GYNECOLOGICAL EXAMINATION: Primary | ICD-10-CM

## 2020-02-17 DIAGNOSIS — G56.02 CARPAL TUNNEL SYNDROME, LEFT: Primary | ICD-10-CM

## 2020-02-17 PROCEDURE — 87624 HPV HI-RISK TYP POOLED RSLT: CPT | Performed by: OBSTETRICS & GYNECOLOGY

## 2020-02-17 PROCEDURE — 87808 TRICHOMONAS ASSAY W/OPTIC: CPT | Performed by: OBSTETRICS & GYNECOLOGY

## 2020-02-17 PROCEDURE — 87491 CHLMYD TRACH DNA AMP PROBE: CPT | Performed by: OBSTETRICS & GYNECOLOGY

## 2020-02-17 PROCEDURE — 99396 PREV VISIT EST AGE 40-64: CPT | Performed by: OBSTETRICS & GYNECOLOGY

## 2020-02-17 PROCEDURE — 87205 SMEAR GRAM STAIN: CPT | Performed by: OBSTETRICS & GYNECOLOGY

## 2020-02-17 PROCEDURE — 87591 N.GONORRHOEAE DNA AMP PROB: CPT | Performed by: OBSTETRICS & GYNECOLOGY

## 2020-02-17 PROCEDURE — 87106 FUNGI IDENTIFICATION YEAST: CPT | Performed by: OBSTETRICS & GYNECOLOGY

## 2020-02-17 PROCEDURE — 88175 CYTOPATH C/V AUTO FLUID REDO: CPT | Performed by: OBSTETRICS & GYNECOLOGY

## 2020-02-17 NOTE — PROGRESS NOTES
Елена here for a checkup. Patient has history of asymptomatic uterine fibroids. She is scheduled to have surgery for carpal tunnel of left wrist tomorrow    Her periods are about 30 to 35 days apart lasting for about 6 to 7 days.     On examination:  Elpidio tablet by mouth every 4 to 6 hours as needed for Pain. For pain after carpal tunnel surgery only. 10 tablet 0   • Vitamin C 500 MG Oral Tab Take 500 mg by mouth 2 (two) times daily.          Family History     Family History   Problem Relation Age of Onset No        Pt has a defibrillator: No        Breast feeding: Not Asked        Reaction to local anesthetic: No        Left Handed: No        Right Handed: Yes        Currently spends a great deal of time in the sun: Yes        Past Sunlamp Treatments for Ac THINPREP PAP SMEAR B; Future  - HPV HIGH RISK , THIN PREP COLLECTION; Future  - CHLAMYDIA/GONOCOCCUS, DIMPLE;  Future  - GENITAL VAGINOSIS SCREEN; Future  - THINPREP PAP SMEAR B  - HPV HIGH RISK , THIN PREP COLLECTION  - CHLAMYDIA/GONOCOCCUS, DIMPLE  - GENITAL VA

## 2020-02-18 ENCOUNTER — ANESTHESIA (OUTPATIENT)
Dept: SURGERY | Facility: HOSPITAL | Age: 50
End: 2020-02-18
Payer: MEDICAID

## 2020-02-18 ENCOUNTER — ANESTHESIA EVENT (OUTPATIENT)
Dept: SURGERY | Facility: HOSPITAL | Age: 50
End: 2020-02-18
Payer: MEDICAID

## 2020-02-18 ENCOUNTER — HOSPITAL DOCUMENTATION (OUTPATIENT)
Dept: SURGERY | Facility: CLINIC | Age: 50
End: 2020-02-18

## 2020-02-18 ENCOUNTER — HOSPITAL ENCOUNTER (OUTPATIENT)
Facility: HOSPITAL | Age: 50
Setting detail: HOSPITAL OUTPATIENT SURGERY
Discharge: HOME | End: 2020-02-18
Attending: PLASTIC SURGERY | Admitting: PLASTIC SURGERY
Payer: MEDICAID

## 2020-02-18 VITALS
OXYGEN SATURATION: 100 % | TEMPERATURE: 98 F | DIASTOLIC BLOOD PRESSURE: 70 MMHG | HEIGHT: 62 IN | RESPIRATION RATE: 18 BRPM | BODY MASS INDEX: 31.65 KG/M2 | WEIGHT: 172 LBS | SYSTOLIC BLOOD PRESSURE: 113 MMHG | HEART RATE: 52 BPM

## 2020-02-18 DIAGNOSIS — G56.02 CARPAL TUNNEL SYNDROME, LEFT: Primary | ICD-10-CM

## 2020-02-18 LAB
B-HCG UR QL: NEGATIVE
C TRACH DNA SPEC QL NAA+PROBE: NEGATIVE
HPV I/H RISK 1 DNA SPEC QL NAA+PROBE: NEGATIVE
N GONORRHOEA DNA SPEC QL NAA+PROBE: NEGATIVE

## 2020-02-18 PROCEDURE — 01N54ZZ RELEASE MEDIAN NERVE, PERCUTANEOUS ENDOSCOPIC APPROACH: ICD-10-PCS | Performed by: PLASTIC SURGERY

## 2020-02-18 PROCEDURE — 29848 WRIST ENDOSCOPY/SURGERY: CPT | Performed by: PLASTIC SURGERY

## 2020-02-18 RX ORDER — SODIUM CHLORIDE, SODIUM LACTATE, POTASSIUM CHLORIDE, CALCIUM CHLORIDE 600; 310; 30; 20 MG/100ML; MG/100ML; MG/100ML; MG/100ML
INJECTION, SOLUTION INTRAVENOUS CONTINUOUS
Status: DISCONTINUED | OUTPATIENT
Start: 2020-02-18 | End: 2020-02-18

## 2020-02-18 RX ORDER — MORPHINE SULFATE 4 MG/ML
2 INJECTION, SOLUTION INTRAMUSCULAR; INTRAVENOUS EVERY 10 MIN PRN
Status: DISCONTINUED | OUTPATIENT
Start: 2020-02-18 | End: 2020-02-18

## 2020-02-18 RX ORDER — IBUPROFEN 800 MG/1
800 TABLET ORAL EVERY 8 HOURS PRN
COMMUNITY

## 2020-02-18 RX ORDER — NALOXONE HYDROCHLORIDE 0.4 MG/ML
80 INJECTION, SOLUTION INTRAMUSCULAR; INTRAVENOUS; SUBCUTANEOUS AS NEEDED
Status: DISCONTINUED | OUTPATIENT
Start: 2020-02-18 | End: 2020-02-18

## 2020-02-18 RX ORDER — HYDROMORPHONE HYDROCHLORIDE 1 MG/ML
0.2 INJECTION, SOLUTION INTRAMUSCULAR; INTRAVENOUS; SUBCUTANEOUS EVERY 5 MIN PRN
Status: DISCONTINUED | OUTPATIENT
Start: 2020-02-18 | End: 2020-02-18

## 2020-02-18 RX ORDER — DEXTROSE MONOHYDRATE 25 G/50ML
50 INJECTION, SOLUTION INTRAVENOUS
Status: DISCONTINUED | OUTPATIENT
Start: 2020-02-18 | End: 2020-02-18

## 2020-02-18 RX ORDER — HYDROMORPHONE HYDROCHLORIDE 1 MG/ML
0.4 INJECTION, SOLUTION INTRAMUSCULAR; INTRAVENOUS; SUBCUTANEOUS EVERY 5 MIN PRN
Status: DISCONTINUED | OUTPATIENT
Start: 2020-02-18 | End: 2020-02-18

## 2020-02-18 RX ORDER — PROCHLORPERAZINE EDISYLATE 5 MG/ML
5 INJECTION INTRAMUSCULAR; INTRAVENOUS ONCE AS NEEDED
Status: DISCONTINUED | OUTPATIENT
Start: 2020-02-18 | End: 2020-02-18

## 2020-02-18 RX ORDER — HYDROCODONE BITARTRATE AND ACETAMINOPHEN 7.5; 325 MG/1; MG/1
1 TABLET ORAL EVERY 4 HOURS PRN
Status: DISCONTINUED | OUTPATIENT
Start: 2020-02-18 | End: 2020-02-18

## 2020-02-18 RX ORDER — MORPHINE SULFATE 10 MG/ML
6 INJECTION, SOLUTION INTRAMUSCULAR; INTRAVENOUS EVERY 10 MIN PRN
Status: DISCONTINUED | OUTPATIENT
Start: 2020-02-18 | End: 2020-02-18

## 2020-02-18 RX ORDER — HYDROCODONE BITARTRATE AND ACETAMINOPHEN 5; 325 MG/1; MG/1
1 TABLET ORAL AS NEEDED
Status: DISCONTINUED | OUTPATIENT
Start: 2020-02-18 | End: 2020-02-18

## 2020-02-18 RX ORDER — HYDROMORPHONE HYDROCHLORIDE 1 MG/ML
0.6 INJECTION, SOLUTION INTRAMUSCULAR; INTRAVENOUS; SUBCUTANEOUS EVERY 5 MIN PRN
Status: DISCONTINUED | OUTPATIENT
Start: 2020-02-18 | End: 2020-02-18

## 2020-02-18 RX ORDER — FAMOTIDINE 20 MG/1
20 TABLET ORAL ONCE
Status: COMPLETED | OUTPATIENT
Start: 2020-02-18 | End: 2020-02-18

## 2020-02-18 RX ORDER — LIDOCAINE HYDROCHLORIDE 5 MG/ML
INJECTION, SOLUTION INFILTRATION; INTRAVENOUS AS NEEDED
Status: DISCONTINUED | OUTPATIENT
Start: 2020-02-18 | End: 2020-02-18 | Stop reason: SURG

## 2020-02-18 RX ORDER — METOCLOPRAMIDE 10 MG/1
10 TABLET ORAL ONCE
Status: COMPLETED | OUTPATIENT
Start: 2020-02-18 | End: 2020-02-18

## 2020-02-18 RX ORDER — ACETAMINOPHEN 500 MG
1000 TABLET ORAL ONCE
Status: COMPLETED | OUTPATIENT
Start: 2020-02-18 | End: 2020-02-18

## 2020-02-18 RX ORDER — MORPHINE SULFATE 4 MG/ML
4 INJECTION, SOLUTION INTRAMUSCULAR; INTRAVENOUS EVERY 10 MIN PRN
Status: DISCONTINUED | OUTPATIENT
Start: 2020-02-18 | End: 2020-02-18

## 2020-02-18 RX ORDER — HALOPERIDOL 5 MG/ML
0.25 INJECTION INTRAMUSCULAR ONCE AS NEEDED
Status: DISCONTINUED | OUTPATIENT
Start: 2020-02-18 | End: 2020-02-18

## 2020-02-18 RX ORDER — KETOROLAC TROMETHAMINE 30 MG/ML
INJECTION, SOLUTION INTRAMUSCULAR; INTRAVENOUS AS NEEDED
Status: DISCONTINUED | OUTPATIENT
Start: 2020-02-18 | End: 2020-02-18 | Stop reason: SURG

## 2020-02-18 RX ORDER — ONDANSETRON 2 MG/ML
4 INJECTION INTRAMUSCULAR; INTRAVENOUS ONCE AS NEEDED
Status: DISCONTINUED | OUTPATIENT
Start: 2020-02-18 | End: 2020-02-18

## 2020-02-18 RX ORDER — MIDAZOLAM HYDROCHLORIDE 1 MG/ML
INJECTION INTRAMUSCULAR; INTRAVENOUS AS NEEDED
Status: DISCONTINUED | OUTPATIENT
Start: 2020-02-18 | End: 2020-02-18 | Stop reason: SURG

## 2020-02-18 RX ORDER — LIDOCAINE HYDROCHLORIDE 10 MG/ML
INJECTION, SOLUTION EPIDURAL; INFILTRATION; INTRACAUDAL; PERINEURAL AS NEEDED
Status: DISCONTINUED | OUTPATIENT
Start: 2020-02-18 | End: 2020-02-18 | Stop reason: SURG

## 2020-02-18 RX ORDER — HYDROCODONE BITARTRATE AND ACETAMINOPHEN 5; 325 MG/1; MG/1
2 TABLET ORAL AS NEEDED
Status: DISCONTINUED | OUTPATIENT
Start: 2020-02-18 | End: 2020-02-18

## 2020-02-18 RX ADMIN — LIDOCAINE HYDROCHLORIDE 50 ML: 5 INJECTION, SOLUTION INFILTRATION; INTRAVENOUS at 07:34:00

## 2020-02-18 RX ADMIN — SODIUM CHLORIDE, SODIUM LACTATE, POTASSIUM CHLORIDE, CALCIUM CHLORIDE: 600; 310; 30; 20 INJECTION, SOLUTION INTRAVENOUS at 08:24:00

## 2020-02-18 RX ADMIN — LIDOCAINE HYDROCHLORIDE 50 MG: 10 INJECTION, SOLUTION EPIDURAL; INFILTRATION; INTRACAUDAL; PERINEURAL at 07:35:00

## 2020-02-18 RX ADMIN — SODIUM CHLORIDE, SODIUM LACTATE, POTASSIUM CHLORIDE, CALCIUM CHLORIDE: 600; 310; 30; 20 INJECTION, SOLUTION INTRAVENOUS at 07:35:00

## 2020-02-18 RX ADMIN — KETOROLAC TROMETHAMINE 30 MG: 30 INJECTION, SOLUTION INTRAMUSCULAR; INTRAVENOUS at 08:14:00

## 2020-02-18 RX ADMIN — MIDAZOLAM HYDROCHLORIDE 2 MG: 1 INJECTION INTRAMUSCULAR; INTRAVENOUS at 07:27:00

## 2020-02-18 NOTE — ANESTHESIA PREPROCEDURE EVALUATION
Anesthesia PreOp Note    HPI:     Harshad Ma is a 51year old female who presents for preoperative consultation requested by: Dory Russell MD    Date of Surgery: 2/18/2020    Procedure(s):  ENDOSCOPIC CARPAL TUNNEL RELEASE  Indication: ca • Bacterial vaginal infection    • Decorative tattoo    • Fibroids    • Gestational diabetes    • Human papilloma virus infection    • Pap smear for cervical cancer screening 11-    negative pap, no HPV       Past Surgical History:   Procedure Lat Used    Substance and Sexual Activity      Alcohol use: Not Currently        Alcohol/week: 0.0 standard drinks      Drug use: No      Sexual activity: Yes    Lifestyle      Physical activity:        Days per week: Not on file        Minutes per session: No °F (36.5 °C)   TempSrc:  Oral   SpO2:  100%   Weight: 77.1 kg (170 lb) 78 kg (172 lb)   Height: 1.575 m (5' 2\")         Anesthesia Evaluation     Patient summary reviewed and Nursing notes reviewed    Airway   Mallampati: I  Dental      Comment: Upper fro

## 2020-02-18 NOTE — ANESTHESIA POSTPROCEDURE EVALUATION
Patient: Art Mendoza    Procedure Summary     Date:  02/18/20 Room / Location:  [address] [place] [address] MAIN OR 01 / 300 [facility] MAIN OR    Anesthesia Start:  5163 Anesthesia Stop:      Procedure:  ENDOSCOPIC CARPAL TUNNEL RELEASE (Left ) Diagnosis:  (carpal tunnel syndrome)

## 2020-02-18 NOTE — OPERATIVE REPORT
[facility]    PATIENT'S NAME: Magdalena Lida   ATTENDING PHYSICIAN: Roby Izquierdo MD   OPERATING PHYSICIAN: Roby Izquierdo MD   PATIENT ACCOUNT#:   [id number]    LOCATION:  [facility] [address] PACU [address] [place] [address]  MEDICAL RECORD #:   [id number] transverse carpal ligament was accomplished with a push knife, a triangle knife, and a pull knife. We had excellent herniation of palmar fat into the cannula.   Of note is that almost the entire transverse carpal ligament consisted of a large palmaris brev

## 2020-02-19 ENCOUNTER — OFFICE VISIT (OUTPATIENT)
Dept: SURGERY | Facility: CLINIC | Age: 50
End: 2020-02-19
Payer: MEDICAID

## 2020-02-19 ENCOUNTER — TELEPHONE (OUTPATIENT)
Dept: SURGERY | Facility: CLINIC | Age: 50
End: 2020-02-19

## 2020-02-19 DIAGNOSIS — M62.81 DISTAL MUSCLE WEAKNESS: ICD-10-CM

## 2020-02-19 DIAGNOSIS — M25.642 STIFFNESS OF JOINT, HAND, LEFT: Primary | ICD-10-CM

## 2020-02-19 LAB
GENITAL VAGINOSIS SCREEN: NEGATIVE
TRICHOMONAS SCREEN: NEGATIVE

## 2020-02-19 NOTE — PROGRESS NOTES
Carpal Tunnel Post - Op Note:    Subjective: The pain medication did not set well with me.       Objective:  Occupational Therapy completed the following educational areas status post elective carpal tunnel procedure:    1)  Dressing was removed and handwas

## 2020-02-19 NOTE — PROGRESS NOTES
Pt aware vaginal cultures PAP/HPV are all negative/normal. Pt expressed understanding with no concerns at this time.

## 2020-02-19 NOTE — TELEPHONE ENCOUNTER
Pt is in the office today for OT evaluation. Spoke with pt. Constant incisional pain. Rates pain 8/10. Taking Norco prn, making her nauseous, no emesis   Pt instructed to try OTC pain medication for pain. Dressing CDI,removed by OT.   Incisions with zaragoza

## 2020-03-06 ENCOUNTER — NURSE ONLY (OUTPATIENT)
Dept: SURGERY | Facility: CLINIC | Age: 50
End: 2020-03-06
Payer: MEDICAID

## 2020-03-06 DIAGNOSIS — G56.02 CARPAL TUNNEL SYNDROME, LEFT: ICD-10-CM

## 2020-03-06 DIAGNOSIS — Z48.02 ENCOUNTER FOR REMOVAL OF SUTURES: Primary | ICD-10-CM

## 2020-03-06 NOTE — PROGRESS NOTES
Surgery 1: LECTR  - Date: 02/18/20  - Days Since: 17    Pt here for suture removal to left hand  Pt identified w/2 identifiers and orders verified. Pt presents w/ band-aid clean, dry and intact. Pt denies c/o pain, use of analgesics, and s/s infection.

## 2020-03-12 ENCOUNTER — OFFICE VISIT (OUTPATIENT)
Dept: SURGERY | Facility: CLINIC | Age: 50
End: 2020-03-12
Payer: MEDICAID

## 2020-03-12 DIAGNOSIS — M25.642 STIFFNESS OF JOINT, HAND, LEFT: Primary | ICD-10-CM

## 2020-03-12 DIAGNOSIS — G56.02 CARPAL TUNNEL SYNDROME, LEFT: Primary | ICD-10-CM

## 2020-03-12 DIAGNOSIS — M62.81 DISTAL MUSCLE WEAKNESS: ICD-10-CM

## 2020-03-12 PROCEDURE — 97110 THERAPEUTIC EXERCISES: CPT | Performed by: OCCUPATIONAL THERAPIST

## 2020-03-12 PROCEDURE — 97166 OT EVAL MOD COMPLEX 45 MIN: CPT | Performed by: OCCUPATIONAL THERAPIST

## 2020-03-12 PROCEDURE — 99024 POSTOP FOLLOW-UP VISIT: CPT | Performed by: PLASTIC SURGERY

## 2020-03-12 NOTE — H&P
NEEDS RECTR      Pacemaker: No  Latex Allergy: no  Coumadin: No  Plavix: No  Other anticoagulants: No  Cardiac stents: No    HAND DOMINANCE: Right  Profession: [employer]      RECONSTRUCTIVE HISTORY    SUN EXPOSURE  Current yes  Past yes  Sunburns ye long discussion. I explained to the patient what carpal tunnel syndrome is including treatment options. The patient  may not have relief of symptoms with treatment. This is a severe carpal tunnel syndrome.     Because of the severity (see Severity In Surgical History:   Procedure Laterality Date   • ABDOMINOPLASTY     • CYST ASPIRATION LEFT      [facility]   • D & C      2 EAB   • ENDOSCOPIC CARPAL TUNNEL RELEASE Left 2/18/2020    Performed by Logan Mak MD at [address] [place] [address] OR   • IR UTERINE

## 2020-03-12 NOTE — PROGRESS NOTES
Surgery 1: LECTR  - Date: 02/18/20  - Days Since: 23  \"Doing better\"  Intermittent scar discomfort. Rates pain 3/10. Taking ibuprofen prn,helpful. Numbness L middle finger improving. Slight edema proximal palm.   Scars healing well without s/s of infe

## 2020-03-12 NOTE — PROGRESS NOTES
Nicol Iba  OTR/L    Current Level of Function: Independent with self care task needs.   Employment: Temporary leave  Hand Dominance: right  Living Situation: Family  Barriers to Learning: None  Patient Goals: Full functional use of bilateral uppe

## 2020-03-19 ENCOUNTER — TELEPHONE (OUTPATIENT)
Dept: FAMILY MEDICINE CLINIC | Facility: CLINIC | Age: 50
End: 2020-03-19

## 2020-03-23 NOTE — TELEPHONE ENCOUNTER
Forms received in forms dept. Per note from dr Brady Cao pt needs an appt for forms to be completed.

## 2020-03-31 ENCOUNTER — TELEPHONE (OUTPATIENT)
Dept: SURGERY | Facility: CLINIC | Age: 50
End: 2020-03-31

## 2020-04-13 NOTE — TELEPHONE ENCOUNTER
LM to pt to make an appt with Dr. Kerri Castaneda before Med eval form can be completed. Pt will need to sign HIPAA/fee.

## 2020-11-19 ENCOUNTER — OFFICE VISIT (OUTPATIENT)
Dept: SURGERY | Facility: CLINIC | Age: 50
End: 2020-11-19
Payer: MEDICAID

## 2020-11-19 DIAGNOSIS — M65.312 TRIGGER THUMB OF LEFT HAND: Primary | ICD-10-CM

## 2020-11-19 DIAGNOSIS — M65.342 TRIGGER RING FINGER OF LEFT HAND: ICD-10-CM

## 2020-11-19 DIAGNOSIS — M65.352 TRIGGER LITTLE FINGER OF LEFT HAND: ICD-10-CM

## 2020-11-19 PROCEDURE — 99214 OFFICE O/P EST MOD 30 MIN: CPT | Performed by: PLASTIC SURGERY

## 2020-11-19 RX ORDER — INDOMETHACIN 25 MG/1
25 CAPSULE ORAL
Qty: 63 CAPSULE | Refills: 0 | Status: SHIPPED | OUTPATIENT
Start: 2020-11-19 | End: 2020-12-10

## 2020-11-19 NOTE — H&P
Surgery 1: LECTR  - Date: 02/18/20  - Days Since: [address] [facility] Osiel Montejo is a 51year old female that presents with Patient presents with:  Pain: LTH, LRF, LSF  .     REFERRED BY:    Deborah Veloz MD    NEEDS RECTR      Pacemaker: No  Latex Allergy: papilloma virus infection    • Pap smear for cervical cancer screening 11-    negative pap, no HPV        SURGICAL  Past Surgical History:   Procedure Laterality Date   • ABDOMINOPLASTY     • CYST ASPIRATION LEFT      [facility]   • D & C      2 TRIGGER DIGIT left thumb, LRF, LSF          We discussed what a TRIGGER DIGIT is, including treatment options. Questions were answered and the patient wishes to proceed with treatment. I would recommend steroid injection.   This has an approximat

## 2020-11-20 ENCOUNTER — TELEPHONE (OUTPATIENT)
Dept: OBGYN CLINIC | Facility: CLINIC | Age: 50
End: 2020-11-20

## 2020-11-20 DIAGNOSIS — Z12.31 VISIT FOR SCREENING MAMMOGRAM: Primary | ICD-10-CM

## 2020-11-20 NOTE — TELEPHONE ENCOUNTER
RN returned Pt's call, notified pt that mammogram order was placed. Pt confirmed having phone number to schedule. No other concerns at this time.

## 2020-11-23 ENCOUNTER — HOSPITAL ENCOUNTER (OUTPATIENT)
Dept: MAMMOGRAPHY | Facility: HOSPITAL | Age: 50
Discharge: HOME | End: 2020-11-23
Attending: OBSTETRICS & GYNECOLOGY
Payer: MEDICAID

## 2020-11-23 ENCOUNTER — APPOINTMENT (OUTPATIENT)
Dept: SURGERY | Facility: CLINIC | Age: 50
End: 2020-11-23
Payer: MEDICAID

## 2020-11-23 DIAGNOSIS — Z12.31 VISIT FOR SCREENING MAMMOGRAM: ICD-10-CM

## 2020-11-23 DIAGNOSIS — M25.642 STIFFNESS OF JOINT, HAND, LEFT: Primary | ICD-10-CM

## 2020-11-23 DIAGNOSIS — M79.602 PAIN OF LEFT UPPER EXTREMITY: ICD-10-CM

## 2020-11-23 PROCEDURE — 97166 OT EVAL MOD COMPLEX 45 MIN: CPT | Performed by: OCCUPATIONAL THERAPIST

## 2020-11-23 PROCEDURE — 77063 BREAST TOMOSYNTHESIS BI: CPT | Performed by: OBSTETRICS & GYNECOLOGY

## 2020-11-23 PROCEDURE — 77067 SCR MAMMO BI INCL CAD: CPT | Performed by: OBSTETRICS & GYNECOLOGY

## 2020-11-23 PROCEDURE — 97035 APP MDLTY 1+ULTRASOUND EA 15: CPT | Performed by: OCCUPATIONAL THERAPIST

## 2020-11-24 NOTE — PROGRESS NOTES
OCCUPATIONAL THERAPY EVALUATION:   Jossy Zuniga   [id number]       SUBJECTIVE:    HX of Injury: Left thumb, small and ring finger pain and triggering.   Chief Complaint:   Left hand pain, with triggering of the LSF. LRF, as well as left thumb, A1 pull in x 1 month:    1) Full functional use of the involved extremity for self-care, leisure and work related tasks:  . Patient will be seen 2 x /week for 4 weeks or a total of 8 visits.    Pt. was advised regarding the findings of this evaluation and ag

## 2020-12-08 ENCOUNTER — OFFICE VISIT (OUTPATIENT)
Dept: SURGERY | Facility: CLINIC | Age: 50
End: 2020-12-08
Payer: MEDICAID

## 2020-12-08 DIAGNOSIS — M79.602 PAIN OF LEFT UPPER EXTREMITY: Primary | ICD-10-CM

## 2020-12-08 PROCEDURE — 97035 APP MDLTY 1+ULTRASOUND EA 15: CPT | Performed by: OCCUPATIONAL THERAPIST

## 2020-12-08 NOTE — PROGRESS NOTES
Subjective: My left thumb and small fingers are triggering and painful. Objective:     Current level of performance:  ADL: Independent, however uncomfortable. Work: In [employer]. Leisure: Not addressed.     Measurements/Tests:  ROM:

## 2020-12-18 ENCOUNTER — HOSPITAL ENCOUNTER (OUTPATIENT)
Age: 50
Discharge: HOME | End: 2020-12-18
Attending: EMERGENCY MEDICINE
Payer: MEDICAID

## 2020-12-18 ENCOUNTER — APPOINTMENT (OUTPATIENT)
Dept: GENERAL RADIOLOGY | Age: 50
End: 2020-12-18
Attending: NURSE PRACTITIONER
Payer: MEDICAID

## 2020-12-18 VITALS
OXYGEN SATURATION: 99 % | DIASTOLIC BLOOD PRESSURE: 82 MMHG | TEMPERATURE: 98 F | SYSTOLIC BLOOD PRESSURE: 117 MMHG | RESPIRATION RATE: 20 BRPM | HEART RATE: 55 BPM

## 2020-12-18 DIAGNOSIS — R07.9 ACUTE CHEST PAIN: Primary | ICD-10-CM

## 2020-12-18 PROCEDURE — 93005 ELECTROCARDIOGRAM TRACING: CPT

## 2020-12-18 PROCEDURE — 36415 COLL VENOUS BLD VENIPUNCTURE: CPT

## 2020-12-18 PROCEDURE — 80047 BASIC METABLC PNL IONIZED CA: CPT

## 2020-12-18 PROCEDURE — 85025 COMPLETE CBC W/AUTO DIFF WBC: CPT | Performed by: NURSE PRACTITIONER

## 2020-12-18 PROCEDURE — 99215 OFFICE O/P EST HI 40 MIN: CPT

## 2020-12-18 PROCEDURE — 81002 URINALYSIS NONAUTO W/O SCOPE: CPT | Performed by: NURSE PRACTITIONER

## 2020-12-18 PROCEDURE — 84484 ASSAY OF TROPONIN QUANT: CPT

## 2020-12-18 PROCEDURE — 93010 ELECTROCARDIOGRAM REPORT: CPT

## 2020-12-18 PROCEDURE — 71046 X-RAY EXAM CHEST 2 VIEWS: CPT | Performed by: NURSE PRACTITIONER

## 2020-12-18 RX ORDER — MAGNESIUM HYDROXIDE/ALUMINUM HYDROXICE/SIMETHICONE 120; 1200; 1200 MG/30ML; MG/30ML; MG/30ML
30 SUSPENSION ORAL ONCE
Status: COMPLETED | OUTPATIENT
Start: 2020-12-18 | End: 2020-12-18

## 2020-12-18 RX ORDER — ASPIRIN 81 MG/1
324 TABLET, CHEWABLE ORAL ONCE
Status: COMPLETED | OUTPATIENT
Start: 2020-12-18 | End: 2020-12-18

## 2020-12-18 NOTE — ED INITIAL ASSESSMENT (HPI)
Left side chest pain - on and off  X 10 days. Worse for the past 2 days. Described as heaviness. Pt states she has been ignoring it. denies any shortness of breath.

## 2020-12-18 NOTE — ED PROVIDER NOTES
Patient Seen in: [facility]      History   Patient presents with:  Chest Pain    Stated Complaint: chest left side pain    HPI    She presents to the urgent care with report of 10 days of intermittent chest discomfort, patient reports not drainage, redness, blurred vision  Denies ear pain, loss of hearing   Denies throat pain, difficulty swallowing, drooling  Neck: Denies neck pain or trauma  Respiratory: Denies sob, denies coughing  Cardiovascular: States positive chest pain, denies lighth extremities  Neuro: Alert and oriented x3, CN grossly intact, strength normal, nonfocal  Integumentary: Clean, dry, intact no rashes, bruises, petechiae, lacerations, abrasions  Behavior: Appropriate, cooperative    ED Course     Labs Reviewed   POCT CBC - reasonable clinical confidence and prior to discharge, that an emergency medical condition was not or was no longer present. There was no indication for further evaluation, treatment or admission on an emergency basis.   Comprehensive verbal and written di

## 2020-12-18 NOTE — ED PROVIDER NOTES
I reviewed that chart and discussed the case. I have examined the patient and noted lungs clear to auscultation bilaterally. Cardiovascular +1 S2 regular rhythm. No murmur. Abdomen soft nontender nondistended. Extremities no calf tenderness or edema.

## 2020-12-28 ENCOUNTER — APPOINTMENT (OUTPATIENT)
Dept: GENERAL RADIOLOGY | Facility: HOSPITAL | Age: 50
End: 2020-12-28
Attending: EMERGENCY MEDICINE
Payer: MEDICAID

## 2020-12-28 ENCOUNTER — HOSPITAL ENCOUNTER (EMERGENCY)
Facility: HOSPITAL | Age: 50
Discharge: HOME | End: 2020-12-28
Attending: EMERGENCY MEDICINE
Payer: MEDICAID

## 2020-12-28 VITALS
HEART RATE: 65 BPM | HEIGHT: 63 IN | BODY MASS INDEX: 30.12 KG/M2 | SYSTOLIC BLOOD PRESSURE: 111 MMHG | DIASTOLIC BLOOD PRESSURE: 75 MMHG | TEMPERATURE: 98 F | WEIGHT: 170 LBS | RESPIRATION RATE: 18 BRPM | OXYGEN SATURATION: 98 %

## 2020-12-28 DIAGNOSIS — R07.9 CHEST PAIN OF UNCERTAIN ETIOLOGY: Primary | ICD-10-CM

## 2020-12-28 PROCEDURE — S0028 INJECTION, FAMOTIDINE, 20 MG: HCPCS | Performed by: EMERGENCY MEDICINE

## 2020-12-28 PROCEDURE — 71045 X-RAY EXAM CHEST 1 VIEW: CPT | Performed by: EMERGENCY MEDICINE

## 2020-12-28 PROCEDURE — 80048 BASIC METABOLIC PNL TOTAL CA: CPT | Performed by: EMERGENCY MEDICINE

## 2020-12-28 PROCEDURE — 93010 ELECTROCARDIOGRAM REPORT: CPT | Performed by: EMERGENCY MEDICINE

## 2020-12-28 PROCEDURE — 85025 COMPLETE CBC W/AUTO DIFF WBC: CPT | Performed by: EMERGENCY MEDICINE

## 2020-12-28 PROCEDURE — 93005 ELECTROCARDIOGRAM TRACING: CPT

## 2020-12-28 PROCEDURE — 84484 ASSAY OF TROPONIN QUANT: CPT | Performed by: EMERGENCY MEDICINE

## 2020-12-28 PROCEDURE — 99284 EMERGENCY DEPT VISIT MOD MDM: CPT

## 2020-12-28 PROCEDURE — 96374 THER/PROPH/DIAG INJ IV PUSH: CPT

## 2020-12-28 PROCEDURE — 85379 FIBRIN DEGRADATION QUANT: CPT | Performed by: EMERGENCY MEDICINE

## 2020-12-28 PROCEDURE — 96375 TX/PRO/DX INJ NEW DRUG ADDON: CPT

## 2020-12-28 RX ORDER — KETOROLAC TROMETHAMINE 15 MG/ML
15 INJECTION, SOLUTION INTRAMUSCULAR; INTRAVENOUS ONCE
Status: COMPLETED | OUTPATIENT
Start: 2020-12-28 | End: 2020-12-28

## 2020-12-28 RX ORDER — FAMOTIDINE 10 MG/ML
20 INJECTION, SOLUTION INTRAVENOUS ONCE
Status: COMPLETED | OUTPATIENT
Start: 2020-12-28 | End: 2020-12-28

## 2020-12-28 NOTE — ED INITIAL ASSESSMENT (HPI)
Patient reports 3 weeks of L sided chest pressure, states that was seen at an immediate care on 12/18 but not given a diagnosis. Denies nausea, vomiting, dizziness or other associated symptoms. Has been taking full dose asa since that visit.

## 2020-12-28 NOTE — ED NOTES
Pt received via triage with c/o left-sided chest pressure and back pain x2 weeks. Pt denies any cough or fevers, is awake and alert, respirations nonlabored, speaking in full sentences.

## 2020-12-28 NOTE — ED PROVIDER NOTES
Patient Seen in: [facility] AND [facility] Emergency Department      History   Patient presents with:  Chest Pain Angina    Stated Complaint: chest pain     HPI    52-year-old female with history of anemia, here with complaints of left-sided chest pressure and b Positive for chest pain. Gastrointestinal: Negative for abdominal pain. Genitourinary: Negative for dysuria. Musculoskeletal: Positive for back pain. Skin: Negative for rash. Neurological: Negative for dizziness.    Psychiatric/Behavioral: Carter Guevara rate   Normal for rhythm    Pulse Oximeter:  Pulse oximetry on room air is 98%, indicating adequate oxygenation.     PROCEDURES:  none    DIAGNOSTICS:   Labs:  Recent Results (from the past 24 hour(s))   BASIC METABOLIC PANEL (8)    Collection Time: 12/28/2 Date: 12/28/2020  CONCLUSION:   No acute cardiopulmonary abnormality.     Dictated by (CST): Radha Chavez MD on 12/28/2020 at 6:49 AM     Finalized by (CST): Radha Chavez MD on 12/28/2020 at 6:50 AM              EMERGENCY DEPARTMENT COURSE AND TREATMENT:  P appointment as soon as possible for a visit in 2 days  As needed          Medications Prescribed:  Current Discharge Medication List

## 2021-11-16 ENCOUNTER — TELEPHONE (OUTPATIENT)
Dept: OBGYN CLINIC | Facility: CLINIC | Age: 51
End: 2021-11-16

## 2021-11-16 DIAGNOSIS — Z12.31 VISIT FOR SCREENING MAMMOGRAM: Primary | ICD-10-CM

## 2021-11-16 NOTE — TELEPHONE ENCOUNTER
RN contacted pt. [facility] L[facility]. Pt scheduled for annual with [facility] 12/28. Order for mammogram placed. Pt confirms having number to central scheduling. Pt denies0 any other concerns.

## 2021-11-16 NOTE — TELEPHONE ENCOUNTER
The patient is calling for an order to get her mammogram. She stated that she was a previous patient of Dr. Benjamin Barboza.

## 2021-12-28 ENCOUNTER — HOSPITAL ENCOUNTER (OUTPATIENT)
Age: 51
Discharge: HOME | End: 2021-12-28
Payer: MEDICAID

## 2021-12-28 VITALS
WEIGHT: 160 LBS | SYSTOLIC BLOOD PRESSURE: 131 MMHG | BODY MASS INDEX: 30.21 KG/M2 | TEMPERATURE: 98 F | HEIGHT: 61 IN | RESPIRATION RATE: 18 BRPM | OXYGEN SATURATION: 100 % | DIASTOLIC BLOOD PRESSURE: 74 MMHG | HEART RATE: 68 BPM

## 2021-12-28 DIAGNOSIS — B34.9 VIRAL ILLNESS: ICD-10-CM

## 2021-12-28 DIAGNOSIS — Z20.822 ENCOUNTER FOR LABORATORY TESTING FOR COVID-19 VIRUS: Primary | ICD-10-CM

## 2021-12-28 PROCEDURE — 99213 OFFICE O/P EST LOW 20 MIN: CPT

## 2021-12-29 NOTE — ED PROVIDER NOTES
Patient Seen in: [facility]      History   No chief complaint on file. Stated Complaint: Covid test    Subjective:   HPI  Patient is a 52-year-old female past medical history of fibroid tumors, anemia presents Covid exposure.  Patient's Current:/74   Pulse 68   Temp 98.2 °F (36.8 °C) (Temporal)   Resp 18   Ht 154.9 cm (5' 1\")   Wt 72.6 kg   SpO2 100%   BMI 30.23 kg/m²         Physical Exam  Vitals and nursing note reviewed.    Constitutional:       General: She is not in acute

## 2021-12-29 NOTE — ED INITIAL ASSESSMENT (HPI)
C/o scratchy throat since last night. Exposed to son (same household) with positive Covid infection today.

## 2022-01-01 LAB — SARS-COV-2 RNA RESP QL NAA+PROBE: DETECTED

## 2022-03-14 ENCOUNTER — HOSPITAL ENCOUNTER (OUTPATIENT)
Dept: MAMMOGRAPHY | Age: 52
Discharge: HOME | End: 2022-03-14
Attending: OBSTETRICS & GYNECOLOGY
Payer: MEDICAID

## 2022-03-14 DIAGNOSIS — Z12.31 VISIT FOR SCREENING MAMMOGRAM: ICD-10-CM

## 2022-03-14 PROCEDURE — 77063 BREAST TOMOSYNTHESIS BI: CPT | Performed by: OBSTETRICS & GYNECOLOGY

## 2022-03-14 PROCEDURE — 77067 SCR MAMMO BI INCL CAD: CPT | Performed by: OBSTETRICS & GYNECOLOGY

## 2022-11-12 ENCOUNTER — HOSPITAL ENCOUNTER (OUTPATIENT)
Age: 52
Discharge: HOME | End: 2022-11-12
Attending: EMERGENCY MEDICINE
Payer: MEDICAID

## 2022-11-12 VITALS
DIASTOLIC BLOOD PRESSURE: 95 MMHG | HEIGHT: 62 IN | SYSTOLIC BLOOD PRESSURE: 133 MMHG | RESPIRATION RATE: 20 BRPM | OXYGEN SATURATION: 99 % | BODY MASS INDEX: 30.91 KG/M2 | HEART RATE: 72 BPM | TEMPERATURE: 97 F | WEIGHT: 168 LBS

## 2022-11-12 DIAGNOSIS — J06.9 VIRAL UPPER RESPIRATORY ILLNESS: Primary | ICD-10-CM

## 2022-11-12 PROCEDURE — 99213 OFFICE O/P EST LOW 20 MIN: CPT

## 2022-11-12 RX ORDER — BENZONATATE 100 MG/1
100 CAPSULE ORAL 3 TIMES DAILY PRN
Qty: 30 CAPSULE | Refills: 0 | Status: SHIPPED | OUTPATIENT
Start: 2022-11-12 | End: 2022-12-12

## 2023-01-03 ENCOUNTER — OFFICE VISIT (OUTPATIENT)
Dept: OBGYN CLINIC | Facility: CLINIC | Age: 53
End: 2023-01-03
Payer: MEDICAID

## 2023-01-03 VITALS
WEIGHT: 174 LBS | DIASTOLIC BLOOD PRESSURE: 70 MMHG | BODY MASS INDEX: 30.83 KG/M2 | SYSTOLIC BLOOD PRESSURE: 122 MMHG | HEIGHT: 63 IN

## 2023-01-03 DIAGNOSIS — Z12.4 PAP SMEAR FOR CERVICAL CANCER SCREENING: ICD-10-CM

## 2023-01-03 DIAGNOSIS — Z12.31 ENCOUNTER FOR SCREENING MAMMOGRAM FOR MALIGNANT NEOPLASM OF BREAST: ICD-10-CM

## 2023-01-03 DIAGNOSIS — Z01.419 VISIT FOR GYNECOLOGIC EXAMINATION: Primary | ICD-10-CM

## 2023-01-03 DIAGNOSIS — Z87.42 HISTORY OF ABNORMAL CERVICAL PAP SMEAR: ICD-10-CM

## 2023-01-03 PROCEDURE — 3078F DIAST BP <80 MM HG: CPT | Performed by: OBSTETRICS & GYNECOLOGY

## 2023-01-03 PROCEDURE — 99396 PREV VISIT EST AGE 40-64: CPT | Performed by: OBSTETRICS & GYNECOLOGY

## 2023-01-03 PROCEDURE — 3074F SYST BP LT 130 MM HG: CPT | Performed by: OBSTETRICS & GYNECOLOGY

## 2023-01-03 PROCEDURE — 3008F BODY MASS INDEX DOCD: CPT | Performed by: OBSTETRICS & GYNECOLOGY

## 2023-01-03 PROCEDURE — 87624 HPV HI-RISK TYP POOLED RSLT: CPT | Performed by: OBSTETRICS & GYNECOLOGY

## 2023-01-03 RX ORDER — ATORVASTATIN CALCIUM 10 MG/1
TABLET, FILM COATED ORAL
COMMUNITY
Start: 2022-12-29

## 2023-01-03 RX ORDER — ERGOCALCIFEROL 1.25 MG/1
CAPSULE ORAL
COMMUNITY
End: 2023-01-03

## 2023-01-04 LAB — HPV I/H RISK 1 DNA SPEC QL NAA+PROBE: NEGATIVE

## 2023-02-20 ENCOUNTER — OFFICE VISIT (OUTPATIENT)
Dept: SURGERY | Facility: CLINIC | Age: 53
End: 2023-02-20

## 2023-02-20 VITALS — RESPIRATION RATE: 20 BRPM | HEART RATE: 64 BPM | SYSTOLIC BLOOD PRESSURE: 104 MMHG | DIASTOLIC BLOOD PRESSURE: 71 MMHG

## 2023-02-20 DIAGNOSIS — M65.311 TRIGGER THUMB OF RIGHT HAND: ICD-10-CM

## 2023-02-20 DIAGNOSIS — M65.351 TRIGGER LITTLE FINGER OF RIGHT HAND: ICD-10-CM

## 2023-02-20 DIAGNOSIS — G56.01 RIGHT CARPAL TUNNEL SYNDROME: Primary | ICD-10-CM

## 2023-02-20 RX ORDER — BETAMETHASONE SODIUM PHOSPHATE AND BETAMETHASONE ACETATE 3; 3 MG/ML; MG/ML
12 INJECTION, SUSPENSION INTRA-ARTICULAR; INTRALESIONAL; INTRAMUSCULAR; SOFT TISSUE ONCE
Status: COMPLETED | OUTPATIENT
Start: 2023-02-20 | End: 2023-02-20

## 2023-02-20 NOTE — PROGRESS NOTES
After evaluation by Dr. Amalia Reddy, orders verified for 2 cc celestone injection(s) to R thumb and R small finger  1cc/6mg Betamethasone and 1cc/10mg 1% Lidocaine drawn up into one syringe for injection(s). Consent obtained and witnessed, and time-out performed by RN. Patient's vitals and pain score pre-and post-injection recorded in vitals section. Patient reclined in exam chair and armboard placed into position for injection(s). Patient tolerated procedure well and band-aid(s) applied. Patient exam chair returned to sitting position and patient left office in satisfactory condition. Patient will call in one month if pain continues or worsens. Patient instructed to call the office with any further questions and/or concerns.

## 2023-02-22 ENCOUNTER — HOSPITAL ENCOUNTER (OUTPATIENT)
Age: 53
Discharge: HOME | End: 2023-02-22
Payer: MEDICAID

## 2023-02-22 ENCOUNTER — APPOINTMENT (OUTPATIENT)
Dept: GENERAL RADIOLOGY | Age: 53
End: 2023-02-22
Attending: NURSE PRACTITIONER
Payer: MEDICAID

## 2023-02-22 VITALS
RESPIRATION RATE: 18 BRPM | TEMPERATURE: 98 F | HEIGHT: 62 IN | DIASTOLIC BLOOD PRESSURE: 81 MMHG | WEIGHT: 176 LBS | OXYGEN SATURATION: 96 % | HEART RATE: 79 BPM | BODY MASS INDEX: 32.39 KG/M2 | SYSTOLIC BLOOD PRESSURE: 127 MMHG

## 2023-02-22 DIAGNOSIS — J40 BRONCHITIS: Primary | ICD-10-CM

## 2023-02-22 LAB — SARS-COV-2 RNA RESP QL NAA+PROBE: NOT DETECTED

## 2023-02-22 PROCEDURE — 71046 X-RAY EXAM CHEST 2 VIEWS: CPT | Performed by: NURSE PRACTITIONER

## 2023-02-22 PROCEDURE — 99213 OFFICE O/P EST LOW 20 MIN: CPT

## 2023-02-22 PROCEDURE — 99214 OFFICE O/P EST MOD 30 MIN: CPT

## 2023-02-22 RX ORDER — PREDNISONE 20 MG/1
40 TABLET ORAL DAILY
Qty: 10 TABLET | Refills: 0 | Status: SHIPPED | OUTPATIENT
Start: 2023-02-22 | End: 2023-02-27

## 2023-02-22 RX ORDER — BENZONATATE 200 MG/1
200 CAPSULE ORAL 3 TIMES DAILY PRN
Qty: 30 CAPSULE | Refills: 0 | Status: SHIPPED | OUTPATIENT
Start: 2023-02-22 | End: 2023-03-04

## 2023-02-22 NOTE — DISCHARGE INSTRUCTIONS
Take medications as directed. You may also continue the Coricidin. Increase oral fluids. Follow-up with your primary care doctor as needed.

## 2023-02-22 NOTE — ED INITIAL ASSESSMENT (HPI)
Pt here c/o cough, congestion for last 2 weeks. Denies fever. Denies sob or cp. States having a slight prod cough. Denies sinus pain.

## 2023-03-09 ENCOUNTER — HOSPITAL ENCOUNTER (OUTPATIENT)
Age: 53
Discharge: HOME | End: 2023-03-09
Payer: MEDICAID

## 2023-03-09 VITALS
TEMPERATURE: 98 F | OXYGEN SATURATION: 98 % | SYSTOLIC BLOOD PRESSURE: 124 MMHG | RESPIRATION RATE: 18 BRPM | HEART RATE: 65 BPM | DIASTOLIC BLOOD PRESSURE: 84 MMHG

## 2023-03-09 DIAGNOSIS — H10.33 ACUTE CONJUNCTIVITIS OF BOTH EYES, UNSPECIFIED ACUTE CONJUNCTIVITIS TYPE: ICD-10-CM

## 2023-03-09 DIAGNOSIS — J01.40 ACUTE NON-RECURRENT PANSINUSITIS: Primary | ICD-10-CM

## 2023-03-09 PROCEDURE — 99213 OFFICE O/P EST LOW 20 MIN: CPT

## 2023-03-09 RX ORDER — POLYMYXIN B SULFATE AND TRIMETHOPRIM 1; 10000 MG/ML; [USP'U]/ML
1 SOLUTION OPHTHALMIC
Qty: 10 ML | Refills: 0 | Status: SHIPPED | OUTPATIENT
Start: 2023-03-09 | End: 2023-03-14

## 2023-03-09 RX ORDER — AMOXICILLIN AND CLAVULANATE POTASSIUM 875; 125 MG/1; MG/1
1 TABLET, FILM COATED ORAL 2 TIMES DAILY
Qty: 14 TABLET | Refills: 0 | Status: SHIPPED | OUTPATIENT
Start: 2023-03-09 | End: 2023-03-16

## 2023-03-09 NOTE — ED INITIAL ASSESSMENT (HPI)
C/o cough, coughing-up mucous 3 weeks was seen here given steroids no resolution and getting worse. Left eye irritation/redness for couple of days-exposed to family member with pink eye. Sinus congestion/pressure for a week.

## 2023-04-05 ENCOUNTER — HOSPITAL ENCOUNTER (OUTPATIENT)
Dept: MAMMOGRAPHY | Age: 53
Discharge: HOME | End: 2023-04-05
Attending: OBSTETRICS & GYNECOLOGY
Payer: MEDICAID

## 2023-04-05 DIAGNOSIS — Z12.31 ENCOUNTER FOR SCREENING MAMMOGRAM FOR MALIGNANT NEOPLASM OF BREAST: ICD-10-CM

## 2023-04-05 PROCEDURE — 77067 SCR MAMMO BI INCL CAD: CPT | Performed by: OBSTETRICS & GYNECOLOGY

## 2023-04-05 PROCEDURE — 77063 BREAST TOMOSYNTHESIS BI: CPT | Performed by: OBSTETRICS & GYNECOLOGY

## 2023-05-03 ENCOUNTER — OFFICE VISIT (OUTPATIENT)
Dept: OBGYN CLINIC | Facility: CLINIC | Age: 53
End: 2023-05-03
Payer: MEDICAID

## 2023-05-03 VITALS
BODY MASS INDEX: 31.39 KG/M2 | HEIGHT: 63 IN | WEIGHT: 177.19 LBS | SYSTOLIC BLOOD PRESSURE: 132 MMHG | DIASTOLIC BLOOD PRESSURE: 82 MMHG

## 2023-05-03 DIAGNOSIS — N95.0 POSTMENOPAUSAL BLEEDING: Primary | ICD-10-CM

## 2023-05-03 PROBLEM — Z98.890 STATUS POST EMBOLIZATION OF UTERINE ARTERY: Status: RESOLVED | Noted: 2017-02-14 | Resolved: 2023-05-03

## 2023-05-03 PROBLEM — Z98.890 H/O REDUCTION MAMMOPLASTY: Status: RESOLVED | Noted: 2018-09-12 | Resolved: 2023-05-03

## 2023-05-03 PROCEDURE — 3008F BODY MASS INDEX DOCD: CPT | Performed by: OBSTETRICS & GYNECOLOGY

## 2023-05-03 PROCEDURE — 99213 OFFICE O/P EST LOW 20 MIN: CPT | Performed by: OBSTETRICS & GYNECOLOGY

## 2023-05-03 PROCEDURE — 3075F SYST BP GE 130 - 139MM HG: CPT | Performed by: OBSTETRICS & GYNECOLOGY

## 2023-05-03 PROCEDURE — 3079F DIAST BP 80-89 MM HG: CPT | Performed by: OBSTETRICS & GYNECOLOGY

## 2023-05-22 ENCOUNTER — HOSPITAL ENCOUNTER (OUTPATIENT)
Dept: ULTRASOUND IMAGING | Age: 53
Discharge: HOME | End: 2023-05-22
Attending: OBSTETRICS & GYNECOLOGY
Payer: MEDICAID

## 2023-05-22 DIAGNOSIS — N95.0 POSTMENOPAUSAL BLEEDING: ICD-10-CM

## 2023-05-22 PROCEDURE — 76830 TRANSVAGINAL US NON-OB: CPT | Performed by: OBSTETRICS & GYNECOLOGY

## 2023-05-22 PROCEDURE — 93975 VASCULAR STUDY: CPT | Performed by: OBSTETRICS & GYNECOLOGY

## 2023-05-22 PROCEDURE — 76856 US EXAM PELVIC COMPLETE: CPT | Performed by: OBSTETRICS & GYNECOLOGY

## 2023-06-05 ENCOUNTER — TELEPHONE (OUTPATIENT)
Dept: OBGYN CLINIC | Facility: CLINIC | Age: 53
End: 2023-06-05

## 2023-06-08 ENCOUNTER — VIRTUAL PHONE E/M (OUTPATIENT)
Dept: OBGYN CLINIC | Facility: CLINIC | Age: 53
End: 2023-06-08
Payer: MEDICAID

## 2023-06-08 DIAGNOSIS — N95.0 POSTMENOPAUSAL BLEEDING: Primary | ICD-10-CM

## 2023-06-08 DIAGNOSIS — D25.0 SUBMUCOUS LEIOMYOMA OF UTERUS: ICD-10-CM

## 2023-06-08 RX ORDER — ERGOCALCIFEROL 1.25 MG/1
CAPSULE ORAL
COMMUNITY
End: 2023-06-08

## 2023-06-08 NOTE — PROGRESS NOTES
Spoke with patient about US results and my recommendation for EMB. She has appointment scheduled on 6/20. We discussed reason for EMB - to r/o hyperplasia / malignancy since the fibroids are obscuring the EMS on US so cannot measure how thick it is. She understands. She also mentioned that the inclusion cyst on the right of her mons seems more noticeable. Not painful, but notices it when she shaves and wants it re-examined. I discussed option for incision / drainage vs monitoring it. She will think about this and let me know if she wants it drained at EMB appt.     Kesha Sandoval, DO

## 2023-06-20 ENCOUNTER — OFFICE VISIT (OUTPATIENT)
Dept: OBGYN CLINIC | Facility: CLINIC | Age: 53
End: 2023-06-20
Payer: MEDICAID

## 2023-06-20 VITALS
BODY MASS INDEX: 31.18 KG/M2 | SYSTOLIC BLOOD PRESSURE: 112 MMHG | HEIGHT: 63 IN | WEIGHT: 176 LBS | DIASTOLIC BLOOD PRESSURE: 80 MMHG

## 2023-06-20 DIAGNOSIS — N95.0 POSTMENOPAUSAL BLEEDING: Primary | ICD-10-CM

## 2023-06-20 PROCEDURE — 3074F SYST BP LT 130 MM HG: CPT | Performed by: OBSTETRICS & GYNECOLOGY

## 2023-06-20 PROCEDURE — 58100 BIOPSY OF UTERUS LINING: CPT | Performed by: OBSTETRICS & GYNECOLOGY

## 2023-06-20 PROCEDURE — 88305 TISSUE EXAM BY PATHOLOGIST: CPT | Performed by: OBSTETRICS & GYNECOLOGY

## 2023-06-20 PROCEDURE — 3079F DIAST BP 80-89 MM HG: CPT | Performed by: OBSTETRICS & GYNECOLOGY

## 2023-06-20 PROCEDURE — 3008F BODY MASS INDEX DOCD: CPT | Performed by: OBSTETRICS & GYNECOLOGY

## 2023-06-20 NOTE — PROGRESS NOTES
ENDOMETRIAL BIOPSY PROCEDURE NOTE  [facility]      DIAGNOSIS:  Postmenopausal bleeding  Patient is a 47year old [id number] who presents for an endometrial biopsy. The patient was informed regarding indication for procedure, technique, and the risk of procedure to include bleeding, infection and perforation. INFORMED CONSENT & TIME OUT:   Informed consent obtained following review of above risks, benefits and alternatives. Time out performed just prior to procedure and documented in visit navigator. PHYSICAL EXAM:  Cervix: no lesions    Uterus: sounds to 11 cm    Adenexa: no masses/nontender    PROCEDURE:  Speculum was placed and the cervix was prepped in sterile fashion with Betadine . The endometrial pipelle was introduced 11 cm into the cervical os without difficulty. Tissue was obtained. The pipelle was removed. Good Hemostasis was noted at the cervical os. Patient tolerated the procedure well with no immediate complications. The tissue was sent to pathology. INSTRUCTIONS GIVEN TO THE PATIENT: Patient was told to return to the office for fever, increased bleeding, abdominal pain, malodorous discharge    DISPOSITION:  Follow-up in 1-2 weeks results.     Phil Hutchinson DO

## 2023-06-20 NOTE — PATIENT INSTRUCTIONS
[facility] Department of OB/GYN  After Care Instructions for Endometrial Biopsy      Biopsy Results   You will receive a phone call with your biopsy results in 7-10 business days. If you have not received your results in 10 days, please contact our office. The results of your biopsy will determine if further treatment will be necessary. Bleeding   You may have some light bleeding or tan-yellow discharge for several days after your biopsy. Restrictions    You should avoid intercourse or tampon use for 1 day after your biopsy. Pain    You may experience mild menstrual cramping after your biopsy. You may use Ibuprofen, Aleve or Tylenol to relieve your discomfort. If you experience severe or persistent pain contact our office. If you have any additional questions, please call us at [phone].

## 2023-06-27 ENCOUNTER — TELEPHONE (OUTPATIENT)
Dept: OBGYN CLINIC | Facility: CLINIC | Age: 53
End: 2023-06-27

## 2023-08-04 ENCOUNTER — HOSPITAL ENCOUNTER (OUTPATIENT)
Age: 53
Discharge: HOME | End: 2023-08-04
Payer: MEDICAID

## 2023-08-04 VITALS
RESPIRATION RATE: 16 BRPM | TEMPERATURE: 98 F | DIASTOLIC BLOOD PRESSURE: 63 MMHG | SYSTOLIC BLOOD PRESSURE: 107 MMHG | HEART RATE: 85 BPM | OXYGEN SATURATION: 96 %

## 2023-08-04 DIAGNOSIS — M79.10 MYALGIA: Primary | ICD-10-CM

## 2023-08-04 DIAGNOSIS — M54.50 ACUTE MIDLINE LOW BACK PAIN, UNSPECIFIED WHETHER SCIATICA PRESENT: ICD-10-CM

## 2023-08-04 LAB
POCT BILIRUBIN URINE: NEGATIVE
POCT BLOOD URINE: NEGATIVE
POCT GLUCOSE URINE: NEGATIVE MG/DL
POCT KETONE URINE: NEGATIVE MG/DL
POCT LEUKOCYTE ESTERASE URINE: NEGATIVE
POCT NITRITE URINE: NEGATIVE
POCT PH URINE: 6 (ref 5–8)
POCT PROTEIN URINE: NEGATIVE MG/DL
POCT SPECIFIC GRAVITY URINE: 1.02
POCT URINE CLARITY: CLEAR
POCT UROBILINOGEN URINE: 0.2 MG/DL
SARS-COV-2 RNA RESP QL NAA+PROBE: NOT DETECTED

## 2023-08-04 PROCEDURE — 99213 OFFICE O/P EST LOW 20 MIN: CPT

## 2023-08-04 PROCEDURE — 81002 URINALYSIS NONAUTO W/O SCOPE: CPT | Performed by: NURSE PRACTITIONER

## 2023-08-04 RX ORDER — NAPROXEN 500 MG/1
500 TABLET ORAL 2 TIMES DAILY PRN
Qty: 14 TABLET | Refills: 0 | Status: SHIPPED | OUTPATIENT
Start: 2023-08-04 | End: 2023-08-11

## 2023-08-04 NOTE — DISCHARGE INSTRUCTIONS
Please follow-up with your primary care provider to discuss lipid evaluation. You may want to take a co-Q10 supplement in conjunction with your statin. If you worsening of symptoms please go to the emergency room.

## 2023-08-04 NOTE — ED INITIAL ASSESSMENT (HPI)
Pt here c/o bilateral leg pain radiating to lower back/abd. Onset 3 days ago. Denies any pain on urination.    Denies n/v.

## 2024-08-28 NOTE — TELEPHONE ENCOUNTER
Would like her EMG report that was done on 4-3-19, results are in encounters. [TextEntry] : Gen: Well appearing, comfortable. HEENT: Normal. CV: RRR, normal S1 and S2, no murmurs.  Resp: CTAB, no wheezing or rhonchi. Abd: Soft, non-tender and non-distended. BS present, no HSM. Ext: Cap refill < 2 seconds. 2+ pulses bilaterally. Skin: Pink and warm.

## 2024-11-09 ENCOUNTER — HOSPITAL ENCOUNTER (OUTPATIENT)
Age: 54
Discharge: HOME | End: 2024-11-09
Payer: MEDICAID

## 2024-11-09 ENCOUNTER — APPOINTMENT (OUTPATIENT)
Dept: CT IMAGING | Age: 54
End: 2024-11-09
Attending: PHYSICIAN ASSISTANT
Payer: MEDICAID

## 2024-11-09 VITALS
DIASTOLIC BLOOD PRESSURE: 74 MMHG | WEIGHT: 180 LBS | OXYGEN SATURATION: 99 % | HEIGHT: 64 IN | SYSTOLIC BLOOD PRESSURE: 117 MMHG | HEART RATE: 65 BPM | TEMPERATURE: 98 F | RESPIRATION RATE: 20 BRPM | BODY MASS INDEX: 30.73 KG/M2

## 2024-11-09 DIAGNOSIS — R10.33 ABDOMINAL PAIN, PERIUMBILICAL: Primary | ICD-10-CM

## 2024-11-09 DIAGNOSIS — R14.2 ERUCTATION: ICD-10-CM

## 2024-11-09 LAB
#MXD IC: 0.3 X10ˆ3/UL (ref 0.1–1)
BILIRUB UR QL STRIP: NEGATIVE
CLARITY UR: CLEAR
COLOR UR: YELLOW
GLUCOSE UR STRIP-MCNC: NEGATIVE MG/DL
HCT VFR BLD AUTO: 41.5 %
HGB BLD-MCNC: 12.6 G/DL
KETONES UR STRIP-MCNC: NEGATIVE MG/DL
LEUKOCYTE ESTERASE UR QL STRIP: NEGATIVE
LYMPHOCYTES # BLD AUTO: 2.3 X10ˆ3/UL (ref 1–4)
LYMPHOCYTES NFR BLD AUTO: 39.3 %
MCH RBC QN AUTO: 25.9 PG (ref 26–34)
MCHC RBC AUTO-ENTMCNC: 30.4 G/DL (ref 31–37)
MCV RBC AUTO: 85.2 FL (ref 80–100)
MIXED CELL %: 6 %
NEUTROPHILS # BLD AUTO: 3.2 X10ˆ3/UL (ref 1.5–7.7)
NEUTROPHILS NFR BLD AUTO: 54.7 %
NITRITE UR QL STRIP: NEGATIVE
PH UR STRIP: 6 [PH]
PLATELET # BLD AUTO: 244 X10ˆ3/UL (ref 150–450)
PROT UR STRIP-MCNC: NEGATIVE MG/DL
RBC # BLD AUTO: 4.87 X10ˆ6/UL
SP GR UR STRIP: 1.02
UROBILINOGEN UR STRIP-ACNC: <2 MG/DL
WBC # BLD AUTO: 5.8 X10ˆ3/UL (ref 4–11)

## 2024-11-09 PROCEDURE — 96375 TX/PRO/DX INJ NEW DRUG ADDON: CPT

## 2024-11-09 PROCEDURE — 74177 CT ABD & PELVIS W/CONTRAST: CPT | Performed by: PHYSICIAN ASSISTANT

## 2024-11-09 PROCEDURE — 99215 OFFICE O/P EST HI 40 MIN: CPT

## 2024-11-09 PROCEDURE — 85025 COMPLETE CBC W/AUTO DIFF WBC: CPT | Performed by: PHYSICIAN ASSISTANT

## 2024-11-09 PROCEDURE — 81002 URINALYSIS NONAUTO W/O SCOPE: CPT

## 2024-11-09 PROCEDURE — S0028 INJECTION, FAMOTIDINE, 20 MG: HCPCS

## 2024-11-09 PROCEDURE — 96374 THER/PROPH/DIAG INJ IV PUSH: CPT

## 2024-11-09 PROCEDURE — 99214 OFFICE O/P EST MOD 30 MIN: CPT

## 2024-11-09 RX ORDER — FAMOTIDINE 20 MG/1
20 TABLET, FILM COATED ORAL DAILY PRN
Qty: 30 TABLET | Refills: 0 | Status: SHIPPED | OUTPATIENT
Start: 2024-11-09 | End: 2024-12-09

## 2024-11-09 RX ORDER — FAMOTIDINE 10 MG/ML
20 INJECTION, SOLUTION INTRAVENOUS ONCE
Status: COMPLETED | OUTPATIENT
Start: 2024-11-09 | End: 2024-11-09

## 2024-11-09 RX ORDER — KETOROLAC TROMETHAMINE 30 MG/ML
15 INJECTION, SOLUTION INTRAMUSCULAR; INTRAVENOUS ONCE
Status: COMPLETED | OUTPATIENT
Start: 2024-11-09 | End: 2024-11-09

## 2024-11-09 NOTE — ED PROVIDER NOTES
Patient Seen in: [facility]      History     Chief Complaint   Patient presents with    Abdomen/Flank Pain     Stated Complaint: stomach pain    Subjective:   HPI      53-year-old female here with complaint of an almost 2-week history of intermittent periumbilical pain that is gotten more persistent.  Patient denies chest pain, shortness of breath, cough.  Patient denies nausea vomiting or diarrhea.  Patient denies dysuria hematuria or flank pain.  Patient is tolerating p.o. speaking full sentences.  Afebrile.    Objective:     Past Medical History:    Anemia    Bacterial vaginal infection    Carpal tunnel syndrome    Decorative tattoo    Fibroids    Gestational diabetes (HCC)    High cholesterol    Human papilloma virus infection    Pap smear for cervical cancer screening    negative pap, no HPV    Pre-diabetes            The patient's medication list, past medical history and social history elements  as listed in today's nurse's notes are reviewed and agree.   The patient's family history is reviewed and is noncontributory to the presenting problem, except as indicated as above.     Past Surgical History:   Procedure Laterality Date    Abdominoplasty      Cyst aspiration left      [facility]    D & c      2 EAB    Ir uterine artery embolization      Other surgical history      CYST REMOVED FROM FOREHEAD    Prior myomectomy      Reduction left      1990 approx    Reduction of large breast      Reduction right                  Social History     Socioeconomic History    Marital status:    Tobacco Use    Smoking status: Never     Passive exposure: Never    Smokeless tobacco: Never   Vaping Use    Vaping status: Never Used   Substance and Sexual Activity    Alcohol use: Yes     Comment: 2-3 times per month    Drug use: No    Sexual activity: Not Currently   Other Topics Concern    History of tanning Yes    Pt has a pacemaker No    Pt has a defibrillator No    Reaction to local anesthetic  No    Left Handed No    Right Handed Yes    Currently spends a great deal of time in the sun Yes    Hx of Spending Great Deal of Time in Sun Yes    Bad sunburns in the past No    Tanning Salons in the Past No    Hx of Radiation Treatments No    Blood Transfusions No   Social History Narrative    Lives with children and mother      Social Drivers of Health      Received from [facility]              Review of Systems    Positive for stated complaint: stomach pain  Other systems are as noted in HPI.  Constitutional and vital signs reviewed.      All other systems reviewed and negative except as noted above.    Physical Exam     ED Triage Vitals [11/09/24 0800]   /74   Pulse 65   Resp 20   Temp 97.8 °F (36.6 °C)   Temp src Temporal   SpO2 99 %   O2 Device None (Room air)       Current Vitals:   Vital Signs  BP: 117/74  Pulse: 65  Resp: 20  Temp: 97.8 °F (36.6 °C)  Temp src: Temporal    Oxygen Therapy  SpO2: 99 %  O2 Device: None (Room air)        Physical Exam  Vitals and nursing note reviewed.   Constitutional:       Appearance: She is well-developed.   HENT:      Head: Normocephalic.      Right Ear: External ear normal.      Left Ear: External ear normal.      Nose: Nose normal.      Mouth/Throat:      Mouth: Mucous membranes are moist.   Eyes:      Extraocular Movements: Extraocular movements intact.      Conjunctiva/sclera: Conjunctivae normal.      Pupils: Pupils are equal, round, and reactive to light.   Cardiovascular:      Rate and Rhythm: Normal rate and regular rhythm.      Heart sounds: Normal heart sounds.   Pulmonary:      Effort: Pulmonary effort is normal.      Breath sounds: Normal breath sounds.   Abdominal:      General: Abdomen is protuberant. Bowel sounds are increased.      Palpations: Abdomen is soft.      Tenderness: There is abdominal tenderness in the periumbilical area. There is no right CVA tenderness or left CVA tenderness.   Musculoskeletal:      Cervical back: Normal  range of motion and neck supple.   Skin:     General: Skin is warm.      Capillary Refill: Capillary refill takes less than 2 seconds.   Neurological:      General: No focal deficit present.      Mental Status: She is alert and oriented to person, place, and time.   Psychiatric:         Mood and Affect: Mood normal.         Behavior: Behavior normal.         Thought Content: Thought content normal.         Judgment: Judgment normal.           ED Course     Labs Reviewed   POCT CBC - Abnormal; Notable for the following components:       Result Value    MCH IC 25.9 (*)     MCHC IC 30.4 (*)     All other components within normal limits   I personally reviewed the xray images and and saw these findings: no acute findings  CT ABDOMEN+PELVIS(CONTRAST ONLY)(CPT=74177)    Result Date: 11/9/2024  PROCEDURE:  CT ABDOMEN+PELVIS (CONTRAST ONLY) (CPT=74177)  COMPARISON:  None.  INDICATIONS:  stomach pain  TECHNIQUE:  CT scanning was performed from the dome of the diaphragm to the pubic symphysis with non-ionic intravenous contrast material. Post contrast coronal MPR imaging was performed.  Dose reduction techniques were used. Dose information is transmitted to the ACR (American College of Radiology) NRDR (National Radiology Data Registry) which includes the Dose Index Registry.  PATIENT STATED HISTORY:(As transcribed by Technologist)  Patient states episodes of stomach and lower abdominal pain for 2 weeks.   CONTRAST USED:  90cc of Isovue 370  FINDINGS:  LIVER:  Diffuse hepatic steatosis is noted.  No focal hepatic lesions are identified. BILIARY:  No visible dilatation or calcification.  PANCREAS:  No lesion, fluid collection, ductal dilatation, or atrophy.  SPLEEN:  No enlargement or focal lesion.  KIDNEYS:  No mass, obstruction, or calcification.  ADRENALS:  No mass or enlargement.  AORTA/VASCULAR:  No aneurysm or dissection.  RETROPERITONEUM:  No mass or adenopathy.  BOWEL/MESENTERY:  No visible mass, obstruction, or bowel  wall thickening.  ABDOMINAL WALL:  No mass or hernia.  Mild diastasis of the rectus muscles is noted. URINARY BLADDER:  No visible focal wall thickening, lesion, or calculus.  PELVIC NODES:  No adenopathy.  PELVIC ORGANS:  The uterus is myomatous BONES:  No bony lesion or fracture.  LUNG BASES:  No visible pulmonary or pleural disease.  OTHER:  Negative.             CONCLUSION:  1. No acute abnormality in the abdomen and pelvis. 2. Diffusely myomatous uterus. 3. Diffuse hepatic steatosis.   LOCATION:  [facility]   Dictated by (CST): Otilio Chavez MD on 11/09/2024 at 9:41 AM     Finalized by (CST): Otilio Chavez MD on 11/09/2024 at 9:45 AM                   MDM       Clinical Impression: eructation/periumbilical pain  Course of Treatment:   Push fluids..  Sleep more upright.  Take Pepcid as needed.  Avoid any gas producing foods.  Persist or worsen i.e. increasing discomfort fevers etc. go directly to the emergency room.  Otherwise recommend follow-up with gastroenterology for further evaluation and treatment.  This case was discussed with the attending physician please see the attestation.     The patient is encouraged to return if any concerning symptoms arise. Additional verbal discharge instructions are given and discussed. Discharge medications are discussed. The patient is in good condition throughout the visit today and remains so upon discharge. I discuss the plan of care with the patient, who expresses understanding. All questions and concerns are addressed to the patient's satisfaction prior to discharge today.  Previous conversations with PCP and charts were reviewed.            Disposition and Plan     Clinical Impression:  1. Abdominal pain, periumbilical    2. Eructation         Disposition:  Discharge  11/9/2024  9:59 am    Follow-up:  Jackie Obrien  [address] [address]  [phone]          Jad Reyes MD  [facility] [place]  [address]  [phone]                Medications Prescribed:  Current Discharge Medication List        START taking these medications    Details   famotidine (PEPCID) 20 MG Oral Tab Take 1 tablet (20 mg total) by mouth daily as needed for Heartburn.  Qty: 30 tablet, Refills: 0                 Supplementary Documentation:

## 2024-11-09 NOTE — ED INITIAL ASSESSMENT (HPI)
Intermittent stomach pain, comes and goes for last 2 weeks. Aching and cramping pain. Denies fevers and body aches at home. Denies diarrhea, nausea, vomiting, but does feel constipated. Bilateral lower abdominal pain.   
No

## 2024-11-09 NOTE — DISCHARGE INSTRUCTIONS
Please return to the ER/clinic if symptoms worsen. Follow-up with your PCP in 24-48 hours as needed.    Push fluids.Sleep more upright.  Take Pepcid as needed.  Avoid any gas producing foods.  Persist or worsen i.e. increasing discomfort fevers etc. go directly to the emergency room.  Otherwise recommend follow-up with gastroenterology for further evaluation and treatment.

## 2024-11-21 ENCOUNTER — OFFICE VISIT (OUTPATIENT)
Dept: OBGYN CLINIC | Facility: CLINIC | Age: 54
End: 2024-11-21
Payer: MEDICAID

## 2024-11-21 VITALS
HEIGHT: 63 IN | WEIGHT: 176 LBS | SYSTOLIC BLOOD PRESSURE: 136 MMHG | BODY MASS INDEX: 31.18 KG/M2 | DIASTOLIC BLOOD PRESSURE: 82 MMHG

## 2024-11-21 DIAGNOSIS — D25.0 SUBMUCOUS LEIOMYOMA OF UTERUS: ICD-10-CM

## 2024-11-21 DIAGNOSIS — N90.7 INCLUSION CYST OF VULVA: ICD-10-CM

## 2024-11-21 DIAGNOSIS — Z01.419 VISIT FOR GYNECOLOGIC EXAMINATION: Primary | ICD-10-CM

## 2024-11-21 PROCEDURE — 99396 PREV VISIT EST AGE 40-64: CPT | Performed by: OBSTETRICS & GYNECOLOGY

## 2024-11-21 NOTE — PROGRESS NOTES
ANNUAL GYN EXAM  [facility]    CHIEF COMPLAINT:    Chief Complaint   Patient presents with    Annual      HISTORY OF PRESENT ILLNESS:   Елена Soliman is a 53 year old female   who presents for annual well woman visit.  She is feeling well.    Annual exam    No complaints.  No bleeding since out last period.    Still has the knot near her groin. It's not painful, bothersome but she is self-concious about it.    Hasn't had sex. Has always been painful - it would bother the fibroids. And sometimes had some bleeding.      Wants tests to make sure she is healthy.    Wants to check on her fibroids  Has a mammogram order.      PAST MEDICAL HISTORY:   Past Medical History:    Anemia    Bacterial vaginal infection    Carpal tunnel syndrome    Decorative tattoo    Fibroids    Gestational diabetes (HCC)    High cholesterol    Human papilloma virus infection    Pap smear for cervical cancer screening    negative pap, no HPV    Pre-diabetes        PAST SURGICAL HISTORY:   Past Surgical History:   Procedure Laterality Date    Abdominoplasty      Cyst aspiration left      [facility]    D & c      2 EAB    Ir uterine artery embolization      Other surgical history      CYST REMOVED FROM FOREHEAD    Prior myomectomy      Reduction left       approx    Reduction of large breast      Reduction right          PAST OB HISTORY:  OB History    Para Term  AB Living   8 3 2 1 5 2   SAB IAB Ectopic Multiple Live Births   5              # Outcome Date GA Lbr Bryan/2nd Weight Sex Type Anes PTL Lv   8             7 SAB            6 SAB            5 SAB            4 SAB            3 SAB            2 Term            1 Term                CURRENT MEDICATIONS:      Current Outpatient Medications:     atorvastatin 10 MG Oral Tab, , Disp: , Rfl:     ibuprofen 800 MG Oral Tab, Take 1 tablet (800 mg total) by mouth every 8 (eight) hours as needed for Pain., Disp: , Rfl:     Vitamin C 500 MG Oral Tab, Take 1  tablet (500 mg total) by mouth 2 (two) times daily., Disp: , Rfl:     famotidine (PEPCID) 20 MG Oral Tab, Take 1 tablet (20 mg total) by mouth daily as needed for Heartburn. (Patient not taking: Reported on 11/21/2024), Disp: 30 tablet, Rfl: 0    cholecalciferol 125 MCG (5000 UT) Oral Tab, cholecalciferol (vitamin D3) 125 mcg (5,000 unit) tablet  TAKE 1 TABLET BY MOUTH DAILY AS DIRECTED (Patient not taking: Reported on 11/21/2024), Disp: , Rfl:     ALLERGIES:  Allergies[1]    SOCIAL HISTORY:  Social History     Socioeconomic History    Marital status:    Tobacco Use    Smoking status: Never     Passive exposure: Never    Smokeless tobacco: Never   Vaping Use    Vaping status: Never Used   Substance and Sexual Activity    Alcohol use: Yes     Comment: 2-3 times per month    Drug use: No    Sexual activity: Not Currently   Other Topics Concern    History of tanning Yes    Pt has a pacemaker No    Pt has a defibrillator No    Reaction to local anesthetic No    Left Handed No    Right Handed Yes    Currently spends a great deal of time in the sun Yes    Hx of Spending Great Deal of Time in Sun Yes    Bad sunburns in the past No    Tanning Salons in the Past No    Hx of Radiation Treatments No    Blood Transfusions No       FAMILY HISTORY:  Family History   Problem Relation Age of Onset    Diabetes Mother     Other (Other) Mother         arthritis    Cancer Father 71        lung    Diabetes Maternal Grandmother      ASSESSMENTS:  REVIEW OF SYSTEMS:  CONSTITUTIONAL:  negative for fevers, chills and sweats    EYES:  negative for  blurred vision and visual disturbance  RESPIRATORY:  negative for  cough and shortness of breath  CARDIOVASCULAR:  negative for  chest pain, palpitations  GASTROINTESTINAL:  No constipation/diarrhea, no pain  GENITOURINARY:  See History of Present Illness  INTEGUMENT/BREAST: Breast: no masses, no nipple discharge  ENDOCRINE:  negative for acne, constipation, diarrhea, cold intolerance,  heat intolerance, fatigue, hair loss, weight gain and weight loss  MUSCULOSKELETAL:  negative for joint pain  NEUROLOGICAL:  negative for dizziness/lightheadedness and headaches  BEHAVIOR/PSYCH:  Negative for depressed mood, anhedonia and anxiety    PHYSICAL EXAM  No LMP recorded. (Menstrual status: Menopause).   Vitals:    11/21/24 1611   BP: 136/82   Weight: 176 lb (79.8 kg)   Height: 63\"       CONSTITUTIONAL: Awake, alert, cooperative, no apparent distress, and appears stated age   NECK: Supple, symmetrical, trachea midline, no adenopathy, thyroid symmetric, not enlarged and no tenderness  LUNGS: no excess work of breathing  ABDOMEN: Soft, non-distended, non-tender, no masses palpated    CHEST/BREASTS: Breasts symmetrical, skin without lesion(s), no nipple retraction or dimpling, no nipple discharge, no masses palpated, no axillary or supraclavicular adenopathy  GENITAL/URINARY:    External Genitalia:  General appearance; normal, Hair distribution; normal, Lesions 1.5 cm diameter nodule on upper right labia majora  Urethral Meatus:  Lesions absent, Prolapse absent  Bladder:  Tenderness absent, Cystocele absent  Vagina:  Discharge absent, Lesions absent, Pelvic support normal  Cervix:  Lesions absent, Discharge absent, Tenderness absent  Uterus:  Size enlarged approx 14 wks size but difficult to assess due to muscle tone, Masses absent, Tenderness absent  Adnexa:  Masses absent, Tenderness absent  Anus/Perineum:  Lesions absent    MUSCULOSKELETAL: There is no redness, warmth, or swelling of the joints.  Tone is normal.  NEUROLOGIC: Patient is awake, alert and oriented to name, place and time. Casual gait is normal.  SKIN: no bruising or bleeding and no rashes  PSYCHIATRIC: Behavior:  Appropriate  Mood:  appropriate  ASSESSMENT AND PLAN:  1. Visit for gynecologic examination  - CBE and pelvic exam today. Due for pap 2026. Self breast awareness discussed.  - has mammo order to schedule  - US to eval fibroids  ordered    2. Submucous leiomyoma of uterus  - US PELVIS (TRANSABDOMINAL AND TRANSVAGINAL) (CPT=76856/89755); Future    3. Inclusion cyst of vulva  - offere I&D today but would prefer a future date. Will schedule.       follow up 1 yr or as needed  Eileen Huerta DO           [1] No Known Allergies

## 2025-01-03 ENCOUNTER — OFFICE VISIT (OUTPATIENT)
Dept: OBGYN CLINIC | Facility: CLINIC | Age: 55
End: 2025-01-03
Payer: MEDICAID

## 2025-01-03 VITALS
SYSTOLIC BLOOD PRESSURE: 126 MMHG | DIASTOLIC BLOOD PRESSURE: 78 MMHG | HEIGHT: 63 IN | BODY MASS INDEX: 30.83 KG/M2 | WEIGHT: 174 LBS

## 2025-01-03 DIAGNOSIS — N90.7 INCLUSION CYST OF VULVA: Primary | ICD-10-CM

## 2025-01-03 PROCEDURE — 99213 OFFICE O/P EST LOW 20 MIN: CPT | Performed by: OBSTETRICS & GYNECOLOGY

## 2025-01-03 RX ORDER — POLYETHYLENE GLYCOL-3350 AND ELECTROLYTES 236; 6.74; 5.86; 2.97; 22.74 G/274.31G; G/274.31G; G/274.31G; G/274.31G; G/274.31G
POWDER, FOR SOLUTION ORAL
COMMUNITY
Start: 2024-12-31

## 2025-01-03 NOTE — PROGRESS NOTES
RETURN GYN OFFICE VISIT  [facility]    CHIEF COMPLAINT:    Chief Complaint   Patient presents with    Follow - Up     Cyst outside vagina       HISTORY OF PRESENT ILLNESS:    NASEEM is a 54 year old female  here for f/u sebaceous cyst.  Wants to make sure it is not anthing bad. [place] a story from a friend about a cyst down there that burst and spread and made her die.    The cyst is not painful, has not grown. She probably only notices it if shaving.  Also has a little bulge on the lower part of vaginal entrance.      REVIEW OF SYSTEMS:   CONSTITUTIONAL:  negative for fevers, chills, sweats and fatigue  GASTROINTESTINAL:  negative for nausea, vomiting, blood in stool, constipation, diarrhea and abdominal pain  GENITOURINARY: negative for no dysuria, urgency or frequency; no urinary incontinence; no hematuria  SKIN:  negative for  rash, skin lesion and pruritus  ENDOCRINE:  negative for acne, fatigue, weight gain and weight loss  BEHAVIOR/PSYCH:  negative for depressed mood, anhedonia and anxiety    CURRENT MEDICATIONS:      Current Outpatient Medications:     omeprazole 20 MG Oral Capsule Delayed Release, Take 1 capsule (20 mg total) by mouth daily., Disp: , Rfl:     atorvastatin 10 MG Oral Tab, , Disp: , Rfl:     ibuprofen 800 MG Oral Tab, Take 1 tablet (800 mg total) by mouth every 8 (eight) hours as needed for Pain., Disp: , Rfl:     Vitamin C 500 MG Oral Tab, Take 1 tablet (500 mg total) by mouth 2 (two) times daily., Disp: , Rfl:     GAVILYTE-G 236 g Oral Recon Soln, , Disp: , Rfl:     cholecalciferol 125 MCG (5000 UT) Oral Tab, cholecalciferol (vitamin D3) 125 mcg (5,000 unit) tablet  TAKE 1 TABLET BY MOUTH DAILY AS DIRECTED (Patient not taking: Reported on 1/3/2025), Disp: , Rfl:     PAST MEDICAL, SOCIAL AND FAMILY HISTORY:    Past Medical History:    Anemia    Bacterial vaginal infection    Carpal tunnel syndrome    Decorative tattoo    Fibroids    Gestational diabetes (HCC)    High cholesterol     Human papilloma virus infection    Pap smear for cervical cancer screening    negative pap, no HPV    Pre-diabetes     Past Surgical History:   Procedure Laterality Date    Abdominoplasty      Cyst aspiration left      [facility]    D & c      2 EAB    Ir uterine artery embolization      Other surgical history      CYST REMOVED FROM FOREHEAD    Prior myomectomy      Reduction left      1990 approx    Reduction of large breast      Reduction right       Family History   Problem Relation Age of Onset    Diabetes Mother     Other (Other) Mother         arthritis    Cancer Father 71        lung    Diabetes Maternal Grandmother      Social History     Socioeconomic History    Marital status:    Tobacco Use    Smoking status: Never     Passive exposure: Never    Smokeless tobacco: Never   Vaping Use    Vaping status: Never Used   Substance and Sexual Activity    Alcohol use: Yes     Comment: 2-3 times per month    Drug use: No    Sexual activity: Not Currently   Other Topics Concern    History of tanning Yes    Pt has a pacemaker No    Pt has a defibrillator No    Reaction to local anesthetic No    Left Handed No    Right Handed Yes    Currently spends a great deal of time in the sun Yes    Hx of Spending Great Deal of Time in Sun Yes    Bad sunburns in the past No    Tanning Salons in the Past No    Hx of Radiation Treatments No    Blood Transfusions No           PHYSICAL EXAM:   No LMP recorded. (Menstrual status: Menopause).; Body mass index is 30.82 kg/m².      CONSTITUTIONAL:  Awake, alert, cooperative, no apparent distress  EYES: sclera clear and conjunctiva normal  GASTROINTESTINAL:  soft, non-distended, non-tender, no masses palpated  GENITAL/URINARY:    External Genitalia:  General appearance; normal, Hair distribution; normal, Lesions absent   Approx 1 cm firm nodule on upper right labia, nontender, no erythema.   Approx 1 cm round bulge in left barholin's gland, nontender, no  erythema.  Anus/Perineum:  Lesions absent  SKIN:  No rashes  PSYCH:  Affect Normal      ASSESSMENT AND PLAN:  1. Inclusion cyst of vulva  - reassured Елена about the benign nature of inclusion cysts - no need to I&D unless is bothering her in some way. She would prefer to leave it alone.  - also reassured her the lower bulge is her bartholin's gland.  - sitz bath PRN.      Eileen Huerta, DO

## 2025-01-07 ENCOUNTER — HOSPITAL ENCOUNTER (OUTPATIENT)
Dept: MAMMOGRAPHY | Age: 55
Discharge: HOME | End: 2025-01-07
Attending: INTERNAL MEDICINE
Payer: MEDICAID

## 2025-01-07 DIAGNOSIS — Z12.31 ENCOUNTER FOR SCREENING MAMMOGRAM FOR MALIGNANT NEOPLASM OF BREAST: ICD-10-CM

## 2025-01-07 PROCEDURE — 77067 SCR MAMMO BI INCL CAD: CPT | Performed by: INTERNAL MEDICINE

## 2025-01-07 PROCEDURE — 77063 BREAST TOMOSYNTHESIS BI: CPT | Performed by: INTERNAL MEDICINE

## 2025-01-23 ENCOUNTER — TELEPHONE (OUTPATIENT)
Dept: OBGYN CLINIC | Facility: CLINIC | Age: 55
End: 2025-01-23

## 2025-03-16 ENCOUNTER — HOSPITAL ENCOUNTER (EMERGENCY)
Facility: HOSPITAL | Age: 55
Discharge: HOME | End: 2025-03-16
Attending: STUDENT IN AN ORGANIZED HEALTH CARE EDUCATION/TRAINING PROGRAM
Payer: MEDICAID

## 2025-03-16 ENCOUNTER — APPOINTMENT (OUTPATIENT)
Dept: GENERAL RADIOLOGY | Facility: HOSPITAL | Age: 55
End: 2025-03-16
Payer: MEDICAID

## 2025-03-16 VITALS
DIASTOLIC BLOOD PRESSURE: 66 MMHG | HEART RATE: 57 BPM | SYSTOLIC BLOOD PRESSURE: 140 MMHG | RESPIRATION RATE: 15 BRPM | OXYGEN SATURATION: 100 % | TEMPERATURE: 97 F

## 2025-03-16 DIAGNOSIS — R07.9 CHEST PAIN, UNSPECIFIED TYPE: Primary | ICD-10-CM

## 2025-03-16 LAB
ALBUMIN SERPL-MCNC: 5 G/DL (ref 3.2–4.8)
ALBUMIN/GLOB SERPL: 1.7 {RATIO} (ref 1–2)
ALP LIVER SERPL-CCNC: 73 U/L
ALT SERPL-CCNC: 21 U/L
ANION GAP SERPL CALC-SCNC: 6 MMOL/L (ref 0–18)
AST SERPL-CCNC: 26 U/L (ref ?–34)
BASOPHILS # BLD AUTO: 0.03 X10(3) UL (ref 0–0.2)
BASOPHILS NFR BLD AUTO: 0.5 %
BILIRUB SERPL-MCNC: 0.3 MG/DL (ref 0.3–1.2)
BUN BLD-MCNC: 14 MG/DL (ref 9–23)
CALCIUM BLD-MCNC: 10.1 MG/DL (ref 8.7–10.6)
CHLORIDE SERPL-SCNC: 108 MMOL/L (ref 98–112)
CO2 SERPL-SCNC: 30 MMOL/L (ref 21–32)
CREAT BLD-MCNC: 0.85 MG/DL
EGFRCR SERPLBLD CKD-EPI 2021: 82 ML/MIN/1.73M2 (ref 60–?)
EOSINOPHIL # BLD AUTO: 0.06 X10(3) UL (ref 0–0.7)
EOSINOPHIL NFR BLD AUTO: 0.9 %
ERYTHROCYTE [DISTWIDTH] IN BLOOD BY AUTOMATED COUNT: 15.3 %
GLOBULIN PLAS-MCNC: 3 G/DL (ref 2–3.5)
GLUCOSE BLD-MCNC: 97 MG/DL (ref 70–99)
HCT VFR BLD AUTO: 39.2 %
HGB BLD-MCNC: 12.5 G/DL
IMM GRANULOCYTES # BLD AUTO: 0.02 X10(3) UL (ref 0–1)
IMM GRANULOCYTES NFR BLD: 0.3 %
LYMPHOCYTES # BLD AUTO: 2.65 X10(3) UL (ref 1–4)
LYMPHOCYTES NFR BLD AUTO: 40.6 %
MCH RBC QN AUTO: 26.7 PG (ref 26–34)
MCHC RBC AUTO-ENTMCNC: 31.9 G/DL (ref 31–37)
MCV RBC AUTO: 83.6 FL
MONOCYTES # BLD AUTO: 0.47 X10(3) UL (ref 0.1–1)
MONOCYTES NFR BLD AUTO: 7.2 %
NEUTROPHILS # BLD AUTO: 3.29 X10 (3) UL (ref 1.5–7.7)
NEUTROPHILS # BLD AUTO: 3.29 X10(3) UL (ref 1.5–7.7)
NEUTROPHILS NFR BLD AUTO: 50.5 %
OSMOLALITY SERPL CALC.SUM OF ELEC: 298 MOSM/KG (ref 275–295)
PLATELET # BLD AUTO: 278 10(3)UL (ref 150–450)
POTASSIUM SERPL-SCNC: 4 MMOL/L (ref 3.5–5.1)
PROT SERPL-MCNC: 8 G/DL (ref 5.7–8.2)
RBC # BLD AUTO: 4.69 X10(6)UL
SODIUM SERPL-SCNC: 144 MMOL/L (ref 136–145)
TROPONIN I SERPL HS-MCNC: <3 NG/L
TROPONIN I SERPL HS-MCNC: <3 NG/L
WBC # BLD AUTO: 6.5 X10(3) UL (ref 4–11)

## 2025-03-16 PROCEDURE — 84484 ASSAY OF TROPONIN QUANT: CPT

## 2025-03-16 PROCEDURE — 80053 COMPREHEN METABOLIC PANEL: CPT

## 2025-03-16 PROCEDURE — 93010 ELECTROCARDIOGRAM REPORT: CPT

## 2025-03-16 PROCEDURE — 71045 X-RAY EXAM CHEST 1 VIEW: CPT

## 2025-03-16 PROCEDURE — 99285 EMERGENCY DEPT VISIT HI MDM: CPT

## 2025-03-16 PROCEDURE — 99284 EMERGENCY DEPT VISIT MOD MDM: CPT

## 2025-03-16 PROCEDURE — 93005 ELECTROCARDIOGRAM TRACING: CPT

## 2025-03-16 PROCEDURE — 84484 ASSAY OF TROPONIN QUANT: CPT | Performed by: STUDENT IN AN ORGANIZED HEALTH CARE EDUCATION/TRAINING PROGRAM

## 2025-03-16 PROCEDURE — 80053 COMPREHEN METABOLIC PANEL: CPT | Performed by: STUDENT IN AN ORGANIZED HEALTH CARE EDUCATION/TRAINING PROGRAM

## 2025-03-16 PROCEDURE — 85025 COMPLETE CBC W/AUTO DIFF WBC: CPT

## 2025-03-16 PROCEDURE — 85025 COMPLETE CBC W/AUTO DIFF WBC: CPT | Performed by: STUDENT IN AN ORGANIZED HEALTH CARE EDUCATION/TRAINING PROGRAM

## 2025-03-16 PROCEDURE — 36415 COLL VENOUS BLD VENIPUNCTURE: CPT

## 2025-03-16 RX ORDER — MAGNESIUM HYDROXIDE/ALUMINUM HYDROXICE/SIMETHICONE 120; 1200; 1200 MG/30ML; MG/30ML; MG/30ML
30 SUSPENSION ORAL ONCE
Status: COMPLETED | OUTPATIENT
Start: 2025-03-16 | End: 2025-03-16

## 2025-03-16 NOTE — ED INITIAL ASSESSMENT (HPI)
CP started 2hrs PTA, non radiating, denies any abd pain or ZOIE   Was moving heavy boxes yesterday

## 2025-03-16 NOTE — ED PROVIDER NOTES
History     Chief Complaint   Patient presents with    Chest Pain Angina       HPI    54 year old female with history of hyperlipidemia presents for assessment of chest pain.  About 2 hours prior to arrival patient developed sharp intermittent pains in her epigastrium/lower chest, associate with nausea, happened about 3 times and has since resolved.  She has no exertional pleuritic symptoms.  She feels a bit of discomfort in her chest at this time but nothing like it was previously.  At baseline normal ET.  Denies any prior cardiopulmonary disease.  Non-smoker.  She does note symptoms started after she ate something that was heavy.  She also notes that yesterday she was lifting some heavy items and has had some soreness in her chest with movement since then.          Past Medical History:    Anemia    Bacterial vaginal infection    Carpal tunnel syndrome    Decorative tattoo    Fibroids    Gestational diabetes (HCC)    High cholesterol    Human papilloma virus infection    Hyperlipidemia    Pap smear for cervical cancer screening    negative pap, no HPV    Pre-diabetes       Past Surgical History:   Procedure Laterality Date    Abdominoplasty      Cyst aspiration left      [facility]    D & c      2 EAB    Ir uterine artery embolization      Other surgical history      CYST REMOVED FROM FOREHEAD    Prior myomectomy      Reduction left      1990 approx    Reduction of large breast      Reduction right         Social History     Socioeconomic History    Marital status:    Tobacco Use    Smoking status: Never     Passive exposure: Never    Smokeless tobacco: Never   Vaping Use    Vaping status: Never Used   Substance and Sexual Activity    Alcohol use: Yes     Comment: 2-3 times per month    Drug use: No    Sexual activity: Not Currently   Other Topics Concern    History of tanning Yes    Pt has a pacemaker No    Pt has a defibrillator No    Reaction to local anesthetic No    Left Handed No    Right  Handed Yes    Currently spends a great deal of time in the sun Yes    Hx of Spending Great Deal of Time in Sun Yes    Bad sunburns in the past No    Tanning Salons in the Past No    Hx of Radiation Treatments No    Blood Transfusions No   Social History Narrative    Lives with children and mother      Social Drivers of Health      Received from [employer]    [facility]                   Physical Exam     ED Triage Vitals [03/16/25 1524]   /71   Pulse 65   Resp 18   Temp 97.1 °F (36.2 °C)   Temp src Temporal   SpO2 97 %   O2 Device None (Room air)       Physical Exam  Constitutional:       General: She is not in acute distress.  Eyes:      Extraocular Movements: Extraocular movements intact.   Cardiovascular:      Rate and Rhythm: Normal rate and regular rhythm.      Pulses: Normal pulses.      Heart sounds: Normal heart sounds.   Pulmonary:      Effort: Pulmonary effort is normal. No respiratory distress.      Breath sounds: Normal breath sounds.   Abdominal:      General: Abdomen is flat. There is no distension.      Tenderness: There is no abdominal tenderness. There is no guarding.   Musculoskeletal:         General: No swelling or deformity.      Cervical back: Normal range of motion.   Neurological:      General: No focal deficit present.      Mental Status: She is alert.              ED Course     Labs Reviewed   COMP METABOLIC PANEL (14) - Abnormal; Notable for the following components:       Result Value    Calculated Osmolality 298 (*)     Albumin 5.0 (*)     All other components within normal limits   TROPONIN I HIGH SENSITIVITY - Normal   TROPONIN I HIGH SENSITIVITY - Normal   CBC WITH DIFFERENTIAL WITH PLATELET     XR CHEST AP PORTABLE  (CPT=71045)    Result Date: 3/16/2025  CONCLUSION:  Normal heart size and pulmonary vascularity.  Lungs clear.   LOCATION:  [facility]      Dictated by (CST): Niurka Ken MD on 3/16/2025 at 3:52 PM     Finalized by (CST): Niurka Ken MD on 3/16/2025 at 3:52 PM             [facility]     Vitals:    03/16/25 1524 03/16/25 1545 03/16/25 1600 03/16/25 1630   BP: 109/71 142/83 137/84 132/82   Pulse: 65 64 61 61   Resp: 18 16 16 20   Temp: 97.1 °F (36.2 °C)      TempSrc: Temporal      SpO2: 97% 100% 99% 100%       Chest pain.  Symptoms are not classically anginal in nature.  Possible gastritis, GERD, peptic ulcer disease, dyspepsia, MSK strain, costochondritis.  Patient has no tachycardia, hypoxia, tachypnea, or shortness of breath to suggest PE.    ED Course as of 03/16/25 1819  ------------------------------------------------------------  Time: 03/16 1606  Comment: EKG interpretation by me: EKG sinus rhythm at a rate of 62, axis nomal, no concerning acute ischemic ST changes  ------------------------------------------------------------  Time: 03/16 1607  Comment: CXR interpretation by me with no concerning acute findings    ------------------------------------------------------------  Time: 03/16 1818  Comment: Trop x2 neg. reassuring hemoglobin and renal function.  ------------------------------------------------------------  Time: 03/16 1819  Comment: Discussed all findings.  Patient is feeling well to be discharged.  Vitals remain stable.  Ambulating without difficulty.  Given written and verbal instructions regarding this condition and strict return precautions.   Will follow up in clinic.   Patient verbalizes understanding of instructions and follow up plan, as well as indications to return to the emergency department.           Disposition and Plan     Clinical Impression:  1. Chest pain, unspecified type        Disposition:  Discharge    Follow-up:  Jackie Obrien  [address] [place] [address] [id number] [phone]    Follow up        Medications Prescribed:  Current Discharge Medication List

## 2025-03-18 LAB
ATRIAL RATE: 62 BPM
P AXIS: -12 DEGREES
P-R INTERVAL: 184 MS
Q-T INTERVAL: 420 MS
QRS DURATION: 80 MS
QTC CALCULATION (BEZET): 426 MS
R AXIS: 5 DEGREES
T AXIS: 11 DEGREES
VENTRICULAR RATE: 62 BPM

## 2025-04-17 ENCOUNTER — E-VISIT (OUTPATIENT)
Dept: TELEHEALTH | Age: 55
End: 2025-04-17
Payer: MEDICAID

## 2025-04-17 DIAGNOSIS — N95.0 POSTMENOPAUSAL BLEEDING: Primary | ICD-10-CM

## 2025-04-17 PROCEDURE — 99421 OL DIG E/M SVC 5-10 MIN: CPT | Performed by: PHYSICIAN ASSISTANT

## 2025-04-17 NOTE — PROGRESS NOTES
Елена Soliman is a 54 year old female who initiated e-visit care today.    HPI:   See answers to questionnaire submission     Current Medications[1]   Past Medical History[2]   Past Surgical History[3]   Family History[4]   Social History:  Short Social Hx on File[5]      ASSESSMENT AND PLAN:       Diagnoses and all orders for this visit:    Postmenopausal bleeding       Discussed scope of practice for EV.  Advised postmenopausal bleeding needs in-person exam and close follow up with OB/GYNE.  Advised calling OB/GYNE today to discuss    Duration of  the service:  6 minutes      See [employer] message exchange and Patient Instructions for Com[employer] Care and patient education.          [1]   Current Outpatient Medications   Medication Sig Dispense Refill    omeprazole 20 MG Oral Capsule Delayed Release Take 1 capsule (20 mg total) by mouth daily.      GAVILYTE-G 236 g Oral Recon Soln  (Patient not taking: Reported on 1/3/2025)      atorvastatin 10 MG Oral Tab       cholecalciferol 125 MCG (5000 UT) Oral Tab cholecalciferol (vitamin D3) 125 mcg (5,000 unit) tablet   TAKE 1 TABLET BY MOUTH DAILY AS DIRECTED (Patient not taking: Reported on 1/3/2025)      ibuprofen 800 MG Oral Tab Take 1 tablet (800 mg total) by mouth every 8 (eight) hours as needed for Pain.      Vitamin C 500 MG Oral Tab Take 1 tablet (500 mg total) by mouth 2 (two) times daily.     [2]   Past Medical History:   Anemia    Bacterial vaginal infection    Carpal tunnel syndrome    Decorative tattoo    Fibroids    Gestational diabetes (HCC)    High cholesterol    Human papilloma virus infection    Hyperlipidemia    Pap smear for cervical cancer screening    negative pap, no HPV    Pre-diabetes   [3]   Past Surgical History:  Procedure Laterality Date    Abdominoplasty      Cyst aspiration left      [facility]    D & c      2 EAB    Ir uterine artery embolization      Other surgical history      CYST REMOVED FROM FOREHEAD    Prior myomectomy       Reduction left      1990 approx    Reduction of large breast      Reduction right     [4]   Family History  Problem Relation Age of Onset    Diabetes Mother     Other (Other) Mother         arthritis    Cancer Father 71        lung    Diabetes Maternal Grandmother    [5]   Social History  Socioeconomic History    Marital status:    Tobacco Use    Smoking status: Never     Passive exposure: Never    Smokeless tobacco: Never   Vaping Use    Vaping status: Never Used   Substance and Sexual Activity    Alcohol use: Yes     Comment: 2-3 times per month    Drug use: No    Sexual activity: Not Currently   Other Topics Concern    History of tanning Yes    Pt has a pacemaker No    Pt has a defibrillator No    Reaction to local anesthetic No    Left Handed No    Right Handed Yes    Currently spends a great deal of time in the sun Yes    Hx of Spending Great Deal of Time in Sun Yes    Bad sunburns in the past No    Tanning Salons in the Past No    Hx of Radiation Treatments No    Blood Transfusions No   Social History Narrative    Lives with children and mother      Social Drivers of Health      Received from [employer]    [facility]

## 2025-04-18 ENCOUNTER — OFFICE VISIT (OUTPATIENT)
Dept: OBGYN CLINIC | Facility: CLINIC | Age: 55
End: 2025-04-18
Payer: MEDICAID

## 2025-04-18 VITALS
SYSTOLIC BLOOD PRESSURE: 136 MMHG | DIASTOLIC BLOOD PRESSURE: 90 MMHG | HEIGHT: 63 IN | BODY MASS INDEX: 31.36 KG/M2 | WEIGHT: 177 LBS

## 2025-04-18 DIAGNOSIS — N95.0 POSTMENOPAUSAL BLEEDING: Primary | ICD-10-CM

## 2025-04-18 PROCEDURE — 88305 TISSUE EXAM BY PATHOLOGIST: CPT | Performed by: OBSTETRICS & GYNECOLOGY

## 2025-04-18 PROCEDURE — 58100 BIOPSY OF UTERUS LINING: CPT | Performed by: OBSTETRICS & GYNECOLOGY

## 2025-04-18 RX ORDER — CLOBETASOL PROPIONATE 0.5 MG/G
1 CREAM TOPICAL 2 TIMES DAILY
Qty: 30 G | Refills: 1 | Status: SHIPPED | OUTPATIENT
Start: 2025-04-18 | End: 2025-04-25

## 2025-04-18 NOTE — PROGRESS NOTES
Last week  Light brown / reddish brown    Then had drops of something come out earlier this week - clots  Then this week has been steady red.    Stomach feels a little crampy.    Earlier in the week, legs feel werid - like a period.    Hasn't had sex.    Has pelvic US scheduled 5/12/25    ENDOMETRIAL BIOPSY PROCEDURE NOTE  [facility]      DIAGNOSIS:  postmenopausal bleeding    INFORMED CONSENT & TIME OUT:   Informed consent obtained following review of above risks, benefits and alternatives. Time out performed just prior to procedure and documented in visit navigator.                                                                                              PHYSICAL EXAM:  Cervix: no lesions    Uterus: enlarged and bulky, uterine fundus about 16 week size, uterus sounds to 12 cm    Adenexa: no masses/nontender    PROCEDURE:  Speculum was inserted and cervix was swabbed with Betadine.  The endometrial pipelle was introduced 12 cm into the cervical os without difficulty.  Tissue was obtained.  The pipelle was removed.  Good Hemostasis was noted at the cervical os.  Patient tolerated the procedure well with no immediate complications.   The tissue was sent to pathology.      INSTRUCTIONS GIVEN TO THE PATIENT: Patient was told to return to the office for fever, increased bleeding, abdominal pain, malodorous discharge    DISPOSITION:  Follow-up in 1-2 weeks results. Has US scheduled 5/12.    Eileen Huerta DO

## 2025-05-12 ENCOUNTER — HOSPITAL ENCOUNTER (OUTPATIENT)
Dept: ULTRASOUND IMAGING | Age: 55
Discharge: HOME | End: 2025-05-12
Attending: OBSTETRICS & GYNECOLOGY
Payer: MEDICAID

## 2025-05-12 DIAGNOSIS — D25.0 SUBMUCOUS LEIOMYOMA OF UTERUS: ICD-10-CM

## 2025-05-12 PROCEDURE — 76830 TRANSVAGINAL US NON-OB: CPT | Performed by: OBSTETRICS & GYNECOLOGY

## 2025-05-12 PROCEDURE — 76856 US EXAM PELVIC COMPLETE: CPT | Performed by: OBSTETRICS & GYNECOLOGY

## 2025-05-15 ENCOUNTER — TELEPHONE (OUTPATIENT)
Dept: OBGYN CLINIC | Facility: CLINIC | Age: 55
End: 2025-05-15

## 2025-05-15 NOTE — TELEPHONE ENCOUNTER
Scheduled 7/11 11:30am. Patient will call back if she needs to reschedule due to work    ----- Message from Eileen Huerta sent at 5/15/2025 11:36 AM CDT -----  Regarding: pls schedule off-hysteroscopy with polypectomy    Hi,Please help Елена schedule an in-office hysteroscopy with polyp removal with me.She has a week off work in June, so would like to coordinate it for that week.I will need the myosure device and larger scope for this, so please coordinate with the myosure rep also. Thank you!~RD

## 2025-06-10 ENCOUNTER — HOSPITAL ENCOUNTER (OUTPATIENT)
Age: 55
Discharge: ANOTHER HOSPITAL | End: 2025-06-10
Attending: EMERGENCY MEDICINE
Payer: MEDICAID

## 2025-06-10 VITALS
OXYGEN SATURATION: 97 % | SYSTOLIC BLOOD PRESSURE: 124 MMHG | RESPIRATION RATE: 18 BRPM | DIASTOLIC BLOOD PRESSURE: 81 MMHG | TEMPERATURE: 98 F | HEART RATE: 72 BPM

## 2025-06-10 DIAGNOSIS — R07.89 CHEST TIGHTNESS: Primary | ICD-10-CM

## 2025-06-10 PROCEDURE — 99214 OFFICE O/P EST MOD 30 MIN: CPT

## 2025-06-10 PROCEDURE — 93005 ELECTROCARDIOGRAM TRACING: CPT

## 2025-06-10 PROCEDURE — 93010 ELECTROCARDIOGRAM REPORT: CPT

## 2025-06-10 NOTE — ED PROVIDER NOTES
Patient Seen in: [facility]        History  Chief Complaint   Patient presents with    Chest Pain Angina     Stated Complaint: chest pains    Subjective:   HPI          Overweight patient with a history of hyperlipidemia and borderline diabetes but no known family history of coronary artery disease and no cigarette smoking.  Patient was to [facility] less than a month ago complaining of left-sided chest pain.  Patient reported sharp intermittent chest pains at that time.  Patient had negative D-dimer and negative cardiac enzymes x 2.  According to the note, admission was offered but patient preferred to be discharged home and she was advised to have cardiology follow-up.  Patient had been to the emergency department 2 months before that with chest pains.  Patient has not yet seen the cardiologist.  She has an upcoming appointment.  Patient reports similar chest discomfort.  She describes a tightness of her chest without associated shortness of breath, sweatiness, or nausea.  It has been intermittent and in varying degrees of intensity for a couple days now.  It was present just before she came to the urgent care but is now pretty much gone.  At times, patient also feels a tight sensation in her legs.  She is worried about heart attack and blood clots  No recent high fever shaking chills.  No cold or cough.  No vomiting or diarrhea.  No black or bloody stool.  No urinary symptoms or flank pain.      Objective:     Past Medical History:    Anemia    Bacterial vaginal infection    Carpal tunnel syndrome    Decorative tattoo    Fibroids    Gestational diabetes (HCC)    High cholesterol    Human papilloma virus infection    Hyperlipidemia    Pap smear for cervical cancer screening    negative pap, no HPV    Pre-diabetes              Past Surgical History:   Procedure Laterality Date    Abdominoplasty      Cyst aspiration left      [facility]    D & c      2 EAB    Ir uterine artery  embolization      Other surgical history      CYST REMOVED FROM FOREHEAD    Prior myomectomy      Reduction left      1990 approx    Reduction of large breast      Reduction right                  Social History     Socioeconomic History    Marital status:    Tobacco Use    Smoking status: Never     Passive exposure: Never    Smokeless tobacco: Never   Vaping Use    Vaping status: Never Used   Substance and Sexual Activity    Alcohol use: Yes     Comment: 2-3 times per month    Drug use: No    Sexual activity: Not Currently   Other Topics Concern    History of tanning Yes    Pt has a pacemaker No    Pt has a defibrillator No    Reaction to local anesthetic No    Left Handed No    Right Handed Yes    Currently spends a great deal of time in the sun Yes    Hx of Spending Great Deal of Time in Sun Yes    Bad sunburns in the past No    Tanning Salons in the Past No    Hx of Radiation Treatments No    Blood Transfusions No   Social History Narrative    Lives with children and mother      Social Drivers of Health      Received from [facility]    [facility]              Review of Systems    Positive for stated complaint: chest pains  Other systems are as noted in HPI.  Constitutional and vital signs reviewed.      All other systems reviewed and negative except as noted above.                  Physical Exam    ED Triage Vitals [06/10/25 1142]   /81   Pulse 72   Resp 18   Temp 97.9 °F (36.6 °C)   Temp src Oral   SpO2 97 %   O2 Device None (Room air)       Current Vitals:   Vital Signs  BP: 124/81  Pulse: 72  Resp: 18  Temp: 97.9 °F (36.6 °C)  Temp src: Oral    Oxygen Therapy  SpO2: 97 %  O2 Device: None (Room air)            Physical Exam  General: The patient is awake, alert, conversant.   Eyes: sclera white, conjunctiva pink and moist.  Lids and lashes are normal.  Neck: With no JVD  Lungs: Clear to auscultation bilaterally.  No rhonchi or rales.  Heart: Normal S1 and S2, without murmur.  Distal pulses are  strong and symmetric.  Neurologic:  Mental status as above.  Patient moves all extremities with good strength and coordination.            ED Course  Labs Reviewed - No data to display       An EKG was performed. I agree with computerized EKG interval interpretations. EKG shows sinus rhythm significant Q waves noted in inferior leads. There are no acute ST changes to suggest acute ischemia or infarct  Ventricular rate 73 bpm. IA interval 160 ms. QRS duration 90 ms.                    MDM    Patient with risk factors for coronary artery disease and recurrent bouts of chest discomfort presents with chest discomfort.  Certainly, acute coronary syndrome including the differential.  No STEMI identified on EKG.  I recommended evaluation in the hospital emergency department.  Stress testing can be considered.  Patient states she is not having discomfort at this time and declined ambulance transport.         Medical Decision Making      Disposition and Plan     Clinical Impression:  1. Chest tightness         Disposition:  Ic to ed  6/10/2025 11:48 am    Follow-up:  No follow-up provider specified.        Medications Prescribed:  Current Discharge Medication List                Supplementary Documentation:

## 2025-06-10 NOTE — ED INITIAL ASSESSMENT (HPI)
Intermittent chest pains for several months, worse today than usual. RLE pain, c/f DVT.    Follows with cardiology

## 2025-06-11 LAB
ATRIAL RATE: 73 BPM
P AXIS: 23 DEGREES
P-R INTERVAL: 160 MS
Q-T INTERVAL: 390 MS
QRS DURATION: 90 MS
QTC CALCULATION (BEZET): 429 MS
R AXIS: 16 DEGREES
T AXIS: 22 DEGREES
VENTRICULAR RATE: 73 BPM

## 2025-07-10 ENCOUNTER — TELEPHONE (OUTPATIENT)
Dept: OBGYN CLINIC | Facility: CLINIC | Age: 55
End: 2025-07-10

## 2025-07-10 NOTE — TELEPHONE ENCOUNTER
Incoming call from patient requesting to speak with RN or doctor regarding questions for procedure tomorrow.     Please assist, thank you.

## 2025-07-11 ENCOUNTER — OFFICE VISIT (OUTPATIENT)
Dept: OBGYN CLINIC | Facility: CLINIC | Age: 55
End: 2025-07-11
Payer: MEDICAID

## 2025-07-11 VITALS — HEIGHT: 63 IN | WEIGHT: 178 LBS | BODY MASS INDEX: 31.54 KG/M2

## 2025-07-11 DIAGNOSIS — N84.0 ENDOMETRIAL POLYP: ICD-10-CM

## 2025-07-11 DIAGNOSIS — N95.0 POSTMENOPAUSAL BLEEDING: Primary | ICD-10-CM

## 2025-07-11 PROCEDURE — 88305 TISSUE EXAM BY PATHOLOGIST: CPT | Performed by: OBSTETRICS & GYNECOLOGY

## 2025-07-11 RX ORDER — CLOBETASOL PROPIONATE 0.5 MG/G
CREAM TOPICAL
COMMUNITY

## 2025-07-11 RX ORDER — ASPIRIN 81 MG/1
81 TABLET ORAL DAILY
COMMUNITY
Start: 2025-06-17

## 2025-07-11 RX ORDER — PANTOPRAZOLE SODIUM 40 MG/1
40 TABLET, DELAYED RELEASE ORAL DAILY
COMMUNITY
Start: 2025-06-17

## 2025-07-11 RX ORDER — EZETIMIBE 10 MG/1
10 TABLET ORAL DAILY
COMMUNITY
Start: 2025-06-19 | End: 2026-06-19

## 2025-07-11 RX ORDER — ROSUVASTATIN CALCIUM 5 MG/1
5 TABLET, COATED ORAL DAILY
COMMUNITY
Start: 2025-06-17

## 2025-07-11 NOTE — PROGRESS NOTES
Office Hysteroscopy IUD removal Procedure Note     Pre-operative Diagnosis:  postmenopausal bleeding, thick endometrium  Post-operative Diagnosis:  same plus endometrial polyps  Procedure:  Hysteroscopy with polyp removal  Surgeon:  Eileen Huerta DO   Technique: The patient was given a dose of IM toradol. Speculum was inserted with visualization of the entire cervix. Cervix was swabbed with betadine x 3. Cervical block was administered with 2% lidocaine at 12, 4 and 7 oclock of the cervicovaginal junction. The anterior lip of the cervix was grasped with a single-toothed tenaculum. The  hysteroscope was then inserted through the cervix into the uterine cavity.  2 small polyps were visible. The myosure device was used to remove the polyps. The larger one on the left side-wall was transected at its base but the body of the polyp could not be removed. It was floating in the endometrial cavity fluid and could not be grasped for removal.   All the instruments were removed from the patient's vagina including the tenaculum with excellent hemostasis noted at the tenaculum sites. The patient tolerated the procedure well.  Post-procedure instructions were reviewed.   Eileen Huerta DO

## (undated) DEVICE — SUTURE ETHILON 4-0 699G

## (undated) DEVICE — BANDAGE ROLL,100% COTTON, 6 PLY, SMALL: Brand: KERLIX

## (undated) DEVICE — SOL  .9 1000ML BTL

## (undated) DEVICE — ESMARK: Brand: DEROYAL

## (undated) DEVICE — PLASTIC HAND: Brand: MEDLINE INDUSTRIES, INC.

## (undated) DEVICE — ECTRA II PROCEDURE KIT,                                    SINGLE-USE, DISPOSABLE. CONTENTS                                    PROBE KNIFE, RETROGRADE KNIFE,                                    TRIANGLE KNIFE, HAND PAD, SWABS: Brand: ECTRA

## (undated) DEVICE — GOWN SURG AERO BLUE PERF LG

## (undated) DEVICE — ZIMMER® STERILE DISPOSABLE TOURNIQUET CUFF WITH PLC, DUAL PORT, SINGLE BLADDER, 18 IN. (46 CM)

## (undated) DEVICE — GAMMEX® PI HYBRID SIZE 7, STERILE POWDER-FREE SURGICAL GLOVE, POLYISOPRENE AND NEOPRENE BLEND: Brand: GAMMEX

## (undated) NOTE — ED AVS SNAPSHOT
Melanie Taylor   MRN: [id number]    Department:  [facility]   Date of Visit:  11/10/2017           Disclosure     Insurance plans vary and the physician(s) referred by the ER may not be covered by your plan.  Please cont CARE PHYSICIAN AT ONCE OR RETURN IMMEDIATELY TO THE EMERGENCY DEPARTMENT. If you have been prescribed any medication(s), please fill your prescription right away and begin taking the medication(s) as directed.   If you believe that any of the medications

## (undated) NOTE — MR AVS SNAPSHOT
After Visit Summary   2/17/2020    Joy Cohen    MRN: [id number]           Visit Information     Date & Time  2/17/2020  2:15 PM Provider  Dayna Fly, [address], [facility] Dept.  Phone  3 2/26/2020 12:30 PM [address], [place], [address], [place]    3/3/2020 12:00 PM [address], [place], [address], [place]    3/10/2020 12:00 PM Nurys Sher 7. Choose a Security Question and enter your Answer and click Next. This can be used at a later time if you forget your password. 8. Enter your e-mail address. You will receive e-mail notification when new information is available in Sheyla Callas.   9. Click S experience and are looking for ways to make improvements. Your feedback will help us do so. For more information on [employer], please visit [email or web address]                   DO YOU KNOW WHERE TO GO?   Injury & illness are neve *Cost varies based on your insurance coverage  For more information about hours, locations or appointment options available at [facility],   visit [email or web address] or call [phone]. ([phone])

## (undated) NOTE — LETTER
20      Patient: Reed Daily  : 12/15/1970 Visit date: 3/12/2020    Dear Steph Rios,      I examined your patient in follow-up today. She is 3 weeks post left endoscopic carpal tunnel release.   Her preoperative symptoms are gone

## (undated) NOTE — LETTER
Елена Soliman, :12/15/1970    CONSENT FOR PROCEDURE/SEDATION    1. I authorize the performance upon Елена Soliman  the following: hysteroscopy - MYOSURE    2. I authorize Dr. Eileen Huerta,  (and whomever is designated as the doctor’s assistant), to perform the above-mentioned procedures.    3. If any unforeseen conditions arise during this procedure calling for additional  procedures, operations, or medications (including anesthesia and blood transfusion), I further request and authorize the doctor to do whatever he/she deems advisable in my interest.    4. I consent to the taking and reproduction of any photographs in the course of this procedure for professional purposes.    5. I consent to the administration of such sedation as may be considered necessary or advisable by the physician responsible for this service, with the exception of ______________________________________________________    6. I have been informed by my doctor of the nature and purpose of this procedure sedation, possible alternative methods of treatment, risk involved and possible complications.        Signature of Patient:_______________________________________________    Signature of person authorized to consent for patient:  _______________________________________________________________    Relationship to patient: ____________________________________________    Witness: _________________________________________ Date:___________     Physician Signature: _______________________________ Date:___________

## (undated) NOTE — LETTER
20      Patient: Erlinda Myrick  : 12/15/1970 Visit date: 2/3/2020    Dear Nick Núñez,      I examined your patient in consultation today. She has severe bilateral carpal tunnel syndrome.     We will plan left endoscopic carpal tunnel

## (undated) NOTE — LETTER
Елена Soliman, :12/15/1970    CONSENT FOR PROCEDURE/SEDATION    1. I authorize the performance upon Елена Soliman  the following: Endometrial biopsy procedure    2. I authorize Dr. Eileen Huerta,  (and whomever is designated as the doctor’s assistant), to perform the above-mentioned procedures.    3. If any unforeseen conditions arise during this procedure calling for additional  procedures, operations, or medications (including anesthesia and blood transfusion), I further request and authorize the doctor to do whatever he/she deems advisable in my interest.    4. I consent to the taking and reproduction of any photographs in the course of this procedure for professional purposes.    5. I consent to the administration of such sedation as may be considered necessary or advisable by the physician responsible for this service, with the exception of ______________________________________________________    6. I have been informed by my doctor of the nature and purpose of this procedure sedation, possible alternative methods of treatment, risk involved and possible complications.        Signature of Patient:_______________________________________________    Signature of person authorized to consent for patient:  _______________________________________________________________    Relationship to patient: ____________________________________________    Witness: _________________________________________ Date:___________     Physician Signature: _______________________________ Date:___________

## (undated) NOTE — MR AVS SNAPSHOT
[facility] [place] [address] [id number]  [phone]               Thank you for choosing us for your health care visit with Akshat Freedman MD.  We are glad to serve you and happy to provide you with this summary of y insurance company's guidelines for prior authorization for this test.  You may be held responsible for payment in full if proper authorization is not acquired.   Please contact the Patient Business Office at [phone] if you have any questions related to [place], [address]    [address] [facility], [address] [place] [address]        Treatment: Evaluate & Treat  Physician goals: Pain relief  Region Specific: Neck / Back  Rehab potential: Good  Frequency: 2 times per week  Duration: 6 weeks  Number of visits: - triamcinolone acetonide 0.025 % Crea            MyChart               Educational Information     Healthy Diet and Regular Exercise  [facility] for making healthy food choices  -   Enjoy your food, but eat less.   Fully enjoy yo

## (undated) NOTE — LETTER
4/25/2018              Елена [address] [address]         To Whom it may concern:    Mehdi Mosquera has had tuberculin purified protein derivative (PPD) tests as listed below.     INDURATION (PPD)   Natanael

## (undated) NOTE — MR AVS SNAPSHOT
[address] at [address], [place] [address]  [phone]               Thank you for choosing us for your health care visit with Luke Solares MD.  We are glad to serve you and happy to provide you with Assoc Dx:  Fibroids, intramural [D25.1]           CBC W Differential W Platelet    Complete by:  Feb 14, 2017 (Approximate)    Assoc Dx:  Fibroids, intramural [D25.1], Abnormal uterine bleeding [N93.9]                 Follow-up Instructions     Return in